# Patient Record
Sex: FEMALE | Race: WHITE | NOT HISPANIC OR LATINO | Employment: FULL TIME | URBAN - METROPOLITAN AREA
[De-identification: names, ages, dates, MRNs, and addresses within clinical notes are randomized per-mention and may not be internally consistent; named-entity substitution may affect disease eponyms.]

---

## 2020-09-16 ENCOUNTER — HOSPITAL ENCOUNTER (OUTPATIENT)
Facility: HOSPITAL | Age: 55
Discharge: HOME | End: 2020-09-17
Attending: EMERGENCY MEDICINE | Admitting: INTERNAL MEDICINE
Payer: COMMERCIAL

## 2020-09-16 ENCOUNTER — CLINICAL SUPPORT (OUTPATIENT)
Dept: CARDIOLOGY | Facility: HOSPITAL | Age: 55
End: 2020-09-16
Attending: INTERNAL MEDICINE
Payer: COMMERCIAL

## 2020-09-16 VITALS — BODY MASS INDEX: 24.25 KG/M2 | WEIGHT: 136.88 LBS | HEIGHT: 63 IN

## 2020-09-16 DIAGNOSIS — R07.9 CHEST PAIN, UNSPECIFIED TYPE: ICD-10-CM

## 2020-09-16 DIAGNOSIS — R07.9 CHEST PAIN: Primary | ICD-10-CM

## 2020-09-16 DIAGNOSIS — E78.5 HYPERLIPIDEMIA, UNSPECIFIED HYPERLIPIDEMIA TYPE: ICD-10-CM

## 2020-09-16 DIAGNOSIS — G47.00 INSOMNIA, UNSPECIFIED TYPE: ICD-10-CM

## 2020-09-16 DIAGNOSIS — J30.2 SEASONAL ALLERGIC RHINITIS, UNSPECIFIED TRIGGER: ICD-10-CM

## 2020-09-16 LAB
ALBUMIN SERPL BCP-MCNC: 4.5 G/DL (ref 3.5–5.2)
ALP SERPL-CCNC: 81 U/L (ref 55–135)
ALT SERPL W/O P-5'-P-CCNC: 25 U/L (ref 10–44)
ANION GAP SERPL CALC-SCNC: 11 MMOL/L (ref 8–16)
AORTIC ROOT ANNULUS: 2.2 CM
AORTIC VALVE CUSP SEPERATION: 1.44 CM
AST SERPL-CCNC: 31 U/L (ref 10–40)
AV INDEX (PROSTH): 0.72
AV MEAN GRADIENT: 6 MMHG
AV PEAK GRADIENT: 10 MMHG
AV VALVE AREA: 2.23 CM2
AV VELOCITY RATIO: 79.18
BASOPHILS # BLD AUTO: 0.02 K/UL (ref 0–0.2)
BASOPHILS NFR BLD: 0.3 % (ref 0–1.9)
BILIRUB SERPL-MCNC: 1.1 MG/DL (ref 0.1–1)
BNP SERPL-MCNC: 36 PG/ML (ref 0–99)
BSA FOR ECHO PROCEDURE: 1.66 M2
BUN SERPL-MCNC: 12 MG/DL (ref 6–20)
CALCIUM SERPL-MCNC: 9.8 MG/DL (ref 8.7–10.5)
CHLORIDE SERPL-SCNC: 102 MMOL/L (ref 95–110)
CK MB SERPL-MCNC: 0.7 NG/ML (ref 0.1–6.5)
CO2 SERPL-SCNC: 27 MMOL/L (ref 23–29)
CREAT SERPL-MCNC: 0.7 MG/DL (ref 0.5–1.4)
CV ECHO LV RWT: 0.4 CM
DIFFERENTIAL METHOD: ABNORMAL
DOP CALC AO PEAK VEL: 1.59 M/S
DOP CALC AO VTI: 34.59 CM
DOP CALC LVOT AREA: 3.1 CM2
DOP CALC LVOT DIAMETER: 1.98 CM
DOP CALC LVOT PEAK VEL: 125.89 M/S
DOP CALC LVOT STROKE VOLUME: 77.15 CM3
DOP CALCLVOT PEAK VEL VTI: 25.07 CM
E WAVE DECELERATION TIME: 153.32 MSEC
E/A RATIO: 1.12
E/E' RATIO: 10.82 M/S
ECHO LV POSTERIOR WALL: 0.82 CM (ref 0.6–1.1)
EOSINOPHIL # BLD AUTO: 0.1 K/UL (ref 0–0.5)
EOSINOPHIL NFR BLD: 1.3 % (ref 0–8)
ERYTHROCYTE [DISTWIDTH] IN BLOOD BY AUTOMATED COUNT: 11.2 % (ref 11.5–14.5)
EST. GFR  (AFRICAN AMERICAN): >60 ML/MIN/1.73 M^2
EST. GFR  (NON AFRICAN AMERICAN): >60 ML/MIN/1.73 M^2
FRACTIONAL SHORTENING: 28 % (ref 28–44)
GLUCOSE SERPL-MCNC: 95 MG/DL (ref 70–110)
HCT VFR BLD AUTO: 41.4 % (ref 37–48.5)
HGB BLD-MCNC: 13.8 G/DL (ref 12–16)
IMM GRANULOCYTES # BLD AUTO: 0.01 K/UL (ref 0–0.04)
IMM GRANULOCYTES NFR BLD AUTO: 0.2 % (ref 0–0.5)
INTERVENTRICULAR SEPTUM: 0.82 CM (ref 0.6–1.1)
IVRT: 80.12 MSEC
LEFT ATRIUM SIZE: 2.89 CM
LEFT INTERNAL DIMENSION IN SYSTOLE: 2.95 CM (ref 2.1–4)
LEFT VENTRICLE MASS INDEX: 61 G/M2
LEFT VENTRICULAR INTERNAL DIMENSION IN DIASTOLE: 4.09 CM (ref 3.5–6)
LEFT VENTRICULAR MASS: 100.2 G
LV LATERAL E/E' RATIO: 9.2 M/S
LV SEPTAL E/E' RATIO: 13.14 M/S
LYMPHOCYTES # BLD AUTO: 1.5 K/UL (ref 1–4.8)
LYMPHOCYTES NFR BLD: 23.5 % (ref 18–48)
MAGNESIUM SERPL-MCNC: 1.9 MG/DL (ref 1.6–2.6)
MAGNESIUM SERPL-MCNC: 2.1 MG/DL (ref 1.6–2.6)
MCH RBC QN AUTO: 31.9 PG (ref 27–31)
MCHC RBC AUTO-ENTMCNC: 33.3 G/DL (ref 32–36)
MCV RBC AUTO: 96 FL (ref 82–98)
MONOCYTES # BLD AUTO: 0.4 K/UL (ref 0.3–1)
MONOCYTES NFR BLD: 6.1 % (ref 4–15)
MV PEAK A VEL: 0.82 M/S
MV PEAK E VEL: 0.92 M/S
NEUTROPHILS # BLD AUTO: 4.4 K/UL (ref 1.8–7.7)
NEUTROPHILS NFR BLD: 68.6 % (ref 38–73)
NRBC BLD-RTO: 0 /100 WBC
PISA TR MAX VEL: 2.03 M/S
PLATELET # BLD AUTO: 106 K/UL (ref 150–350)
PMV BLD AUTO: 12.3 FL (ref 9.2–12.9)
POTASSIUM SERPL-SCNC: 4.3 MMOL/L (ref 3.5–5.1)
PROT SERPL-MCNC: 7.1 G/DL (ref 6–8.4)
PV PEAK VELOCITY: 86.04 CM/S
RA PRESSURE: 3 MMHG
RBC # BLD AUTO: 4.32 M/UL (ref 4–5.4)
RIGHT VENTRICULAR END-DIASTOLIC DIMENSION: 173 CM
SARS-COV-2 RDRP RESP QL NAA+PROBE: NEGATIVE
SODIUM SERPL-SCNC: 140 MMOL/L (ref 136–145)
TDI LATERAL: 0.1 M/S
TDI SEPTAL: 0.07 M/S
TDI: 0.09 M/S
TR MAX PG: 16 MMHG
TROPONIN I SERPL DL<=0.01 NG/ML-MCNC: <0.03 NG/ML
TV REST PULMONARY ARTERY PRESSURE: 19 MMHG
WBC # BLD AUTO: 6.37 K/UL (ref 3.9–12.7)

## 2020-09-16 PROCEDURE — 99900035 HC TECH TIME PER 15 MIN (STAT)

## 2020-09-16 PROCEDURE — 93306 TTE W/DOPPLER COMPLETE: CPT

## 2020-09-16 PROCEDURE — 36415 COLL VENOUS BLD VENIPUNCTURE: CPT

## 2020-09-16 PROCEDURE — 80053 COMPREHEN METABOLIC PANEL: CPT

## 2020-09-16 PROCEDURE — 93306 ECHO (CUPID ONLY): ICD-10-PCS | Mod: 26,,, | Performed by: INTERNAL MEDICINE

## 2020-09-16 PROCEDURE — 93010 EKG 12-LEAD: ICD-10-PCS | Mod: ,,, | Performed by: INTERNAL MEDICINE

## 2020-09-16 PROCEDURE — 94760 N-INVAS EAR/PLS OXIMETRY 1: CPT

## 2020-09-16 PROCEDURE — U0002 COVID-19 LAB TEST NON-CDC: HCPCS

## 2020-09-16 PROCEDURE — 63600175 PHARM REV CODE 636 W HCPCS: Performed by: INTERNAL MEDICINE

## 2020-09-16 PROCEDURE — 82553 CREATINE MB FRACTION: CPT

## 2020-09-16 PROCEDURE — 99900031 HC PATIENT EDUCATION (STAT)

## 2020-09-16 PROCEDURE — 93306 TTE W/DOPPLER COMPLETE: CPT | Mod: 26,,, | Performed by: INTERNAL MEDICINE

## 2020-09-16 PROCEDURE — G0378 HOSPITAL OBSERVATION PER HR: HCPCS

## 2020-09-16 PROCEDURE — 93005 ELECTROCARDIOGRAM TRACING: CPT | Performed by: INTERNAL MEDICINE

## 2020-09-16 PROCEDURE — 85025 COMPLETE CBC W/AUTO DIFF WBC: CPT

## 2020-09-16 PROCEDURE — 93010 ELECTROCARDIOGRAM REPORT: CPT | Mod: ,,, | Performed by: INTERNAL MEDICINE

## 2020-09-16 PROCEDURE — 83880 ASSAY OF NATRIURETIC PEPTIDE: CPT

## 2020-09-16 PROCEDURE — 99220 PR INITIAL OBSERVATION CARE,LEVL III: CPT | Mod: ,,, | Performed by: INTERNAL MEDICINE

## 2020-09-16 PROCEDURE — 99220 PR INITIAL OBSERVATION CARE,LEVL III: ICD-10-PCS | Mod: ,,, | Performed by: INTERNAL MEDICINE

## 2020-09-16 PROCEDURE — 84484 ASSAY OF TROPONIN QUANT: CPT | Mod: 91

## 2020-09-16 PROCEDURE — 83735 ASSAY OF MAGNESIUM: CPT | Mod: 91

## 2020-09-16 PROCEDURE — 83735 ASSAY OF MAGNESIUM: CPT

## 2020-09-16 PROCEDURE — 96372 THER/PROPH/DIAG INJ SC/IM: CPT | Mod: 59

## 2020-09-16 PROCEDURE — 25000003 PHARM REV CODE 250: Performed by: EMERGENCY MEDICINE

## 2020-09-16 PROCEDURE — 84484 ASSAY OF TROPONIN QUANT: CPT

## 2020-09-16 PROCEDURE — 25000003 PHARM REV CODE 250: Performed by: INTERNAL MEDICINE

## 2020-09-16 PROCEDURE — 99285 EMERGENCY DEPT VISIT HI MDM: CPT | Mod: 25

## 2020-09-16 RX ORDER — POLYETHYLENE GLYCOL 3350 17 G/17G
17 POWDER, FOR SOLUTION ORAL DAILY PRN
Status: DISCONTINUED | OUTPATIENT
Start: 2020-09-16 | End: 2020-09-17 | Stop reason: HOSPADM

## 2020-09-16 RX ORDER — NITROGLYCERIN 0.4 MG/1
0.4 TABLET SUBLINGUAL EVERY 5 MIN PRN
Status: DISCONTINUED | OUTPATIENT
Start: 2020-09-16 | End: 2020-09-17 | Stop reason: HOSPADM

## 2020-09-16 RX ORDER — TALC
9 POWDER (GRAM) TOPICAL NIGHTLY PRN
Status: DISCONTINUED | OUTPATIENT
Start: 2020-09-16 | End: 2020-09-17 | Stop reason: HOSPADM

## 2020-09-16 RX ORDER — TALC
8 POWDER (GRAM) TOPICAL NIGHTLY PRN
Status: DISCONTINUED | OUTPATIENT
Start: 2020-09-16 | End: 2020-09-16

## 2020-09-16 RX ORDER — ENOXAPARIN SODIUM 100 MG/ML
40 INJECTION SUBCUTANEOUS EVERY 24 HOURS
Status: DISCONTINUED | OUTPATIENT
Start: 2020-09-16 | End: 2020-09-17 | Stop reason: HOSPADM

## 2020-09-16 RX ORDER — ROSUVASTATIN CALCIUM 20 MG/1
20 TABLET, COATED ORAL DAILY
Status: DISCONTINUED | OUTPATIENT
Start: 2020-09-16 | End: 2020-09-17 | Stop reason: HOSPADM

## 2020-09-16 RX ORDER — DIPHENHYDRAMINE HCL 25 MG
50 CAPSULE ORAL NIGHTLY PRN
Status: DISCONTINUED | OUTPATIENT
Start: 2020-09-16 | End: 2020-09-17 | Stop reason: HOSPADM

## 2020-09-16 RX ORDER — CETIRIZINE HYDROCHLORIDE 10 MG/1
10 TABLET ORAL DAILY
Status: DISCONTINUED | OUTPATIENT
Start: 2020-09-16 | End: 2020-09-17 | Stop reason: HOSPADM

## 2020-09-16 RX ORDER — ASPIRIN 325 MG
325 TABLET ORAL
Status: COMPLETED | OUTPATIENT
Start: 2020-09-16 | End: 2020-09-16

## 2020-09-16 RX ORDER — ASPIRIN 81 MG/1
81 TABLET ORAL DAILY
Status: DISCONTINUED | OUTPATIENT
Start: 2020-09-17 | End: 2020-09-17 | Stop reason: HOSPADM

## 2020-09-16 RX ORDER — ACETAMINOPHEN 325 MG/1
650 TABLET ORAL EVERY 4 HOURS PRN
Status: DISCONTINUED | OUTPATIENT
Start: 2020-09-16 | End: 2020-09-17 | Stop reason: HOSPADM

## 2020-09-16 RX ORDER — SODIUM CHLORIDE 0.9 % (FLUSH) 0.9 %
10 SYRINGE (ML) INJECTION
Status: DISCONTINUED | OUTPATIENT
Start: 2020-09-16 | End: 2020-09-17 | Stop reason: HOSPADM

## 2020-09-16 RX ORDER — LORAZEPAM 2 MG/ML
1 INJECTION INTRAMUSCULAR
Status: DISCONTINUED | OUTPATIENT
Start: 2020-09-16 | End: 2020-09-16

## 2020-09-16 RX ORDER — ONDANSETRON 2 MG/ML
4 INJECTION INTRAMUSCULAR; INTRAVENOUS EVERY 8 HOURS PRN
Status: DISCONTINUED | OUTPATIENT
Start: 2020-09-16 | End: 2020-09-17 | Stop reason: HOSPADM

## 2020-09-16 RX ADMIN — ROSUVASTATIN CALCIUM 20 MG: 20 TABLET, FILM COATED ORAL at 03:09

## 2020-09-16 RX ADMIN — NITROGLYCERIN 0.5 INCH: 20 OINTMENT TOPICAL at 10:09

## 2020-09-16 RX ADMIN — ASPIRIN 325 MG ORAL TABLET 325 MG: 325 PILL ORAL at 10:09

## 2020-09-16 RX ADMIN — ENOXAPARIN SODIUM 40 MG: 100 INJECTION SUBCUTANEOUS at 04:09

## 2020-09-16 NOTE — ED NOTES
Patient is resting comfortably. No pain at this times states it still comes and goes and does not last but few seconds

## 2020-09-16 NOTE — CONSULTS
[facility]  Cardiology  Consult Note    Patient Name: Lina King  MRN: [id number]  Admission Date: 2020  Hospital Length of Stay: 0 days  Code Status: Full Code   Attending Provider: Tien Valdez MD   Consulting Provider: Kristal Morrison MD  Primary Care Physician: Wilda Huynh DO  Principal Problem:Chest pain    Patient information was obtained from patient, past medical records and ER records.     Consults  Subjective:     REASON FOR CONSULT:  Chest pain     HPI:  55-year-old female with a past medical history significant for borderline diabetes mellitus, family history of premature coronary artery disease, her mom reportedly passed away in her 40s secondary to myocardial infarction, asthma presented to the hospital with complaints of chest pain.  Chest pain is located on the left side that lasts a few seconds and spontaneously resolves.  She denies any shortness of breath.  She denies any palpitations.  She denies any lightheadedness, dizziness or any syncopal episodes.  She denies any lower extremity edema.  She has not had any similar symptoms in the past.  She has not had any heart problems that she knows of.  EKG does not show any dynamic ischemic changes.  Serial troponins thus far are negative.    Past Medical History:   Diagnosis Date    Actinic keratosis     AK (actinic keratosis)     Allergic rhinitis     Allergy     Asthma, intermittent     Basal cell cancer ed&c in [place]    chest, right upper lateral thigh    Basal cell carcinoma excised 13    R lateral thigh    BCC (basal cell carcinoma of skin) excised 9/10/13    right lower abdomen    Hyperlipidemia     Keloid cicatrix     Kidney calculi     in early 30's    Recurrent UTI        Past Surgical History:   Procedure Laterality Date    anal fissure      APPENDECTOMY      basal cell removal      breast implants       SECTION, LOW TRANSVERSE      INCISION OF BLADDER NECK CONTRACTURE          Review of patient's allergies indicates:   Allergen Reactions    Penicillins Other (See Comments)     As a child       No current facility-administered medications on file prior to encounter.      Current Outpatient Medications on File Prior to Encounter   Medication Sig    cetirizine (ZYRTEC) 10 MG tablet Take 1 tablet (10 mg total) by mouth once daily.    [DISCONTINUED] doxycycline (VIBRAMYCIN) 100 MG Cap Take 1 tab po BID x 10 days. Take with food and water. Remain upright x 1 hr after taking.    [DISCONTINUED] predniSONE (DELTASONE) 20 MG tablet 3 pills daily for 3 days,  2 pills daily for 3 days,  1 pill daily for 3 days, 1/2 pill daily for 4 days.       Scheduled Meds:   [START ON 9/17/2020] aspirin  81 mg Oral Daily    cetirizine  10 mg Oral Daily    enoxaparin  40 mg Subcutaneous Q24H    rosuvastatin  20 mg Oral Daily     Continuous Infusions:  PRN Meds:.acetaminophen, diphenhydrAMINE, melatonin, nitroGLYCERIN, ondansetron, polyethylene glycol, sodium chloride 0.9%    Family History     Problem Relation (Age of Onset)    ALS Mother    Alcohol abuse Father    Cancer Father    Melanoma Sister    Thyroid disease Sister          Tobacco Use    Smoking status: Never Smoker    Smokeless tobacco: Never Used   Substance and Sexual Activity    Alcohol use: No     Comment: rare    Drug use: Never    Sexual activity: Yes     Partners: Male       ROS   No significant headaches or sore throat or runny nose.   No recent changes in vision.   No recent changes in hearing.  No dysphagia or odynophagia.  Reports chest pain.   Denies any cough or hemoptysis.   Denies any abdominal pain, nausea, vomiting, diarrhea or constipation.   Denies any dysuria or polyuria.   Denies any fevers or chills.   Denies any recent significant weight changes.   Denies bleeding diathesis    Objective:     Vital Signs (Most Recent):  Temp: 98.1 °F (36.7 °C) (09/16/20 1637)  Pulse: 65 (09/16/20 1637)  Resp: 20 (09/16/20  1637)  BP: (!) 154/89 (09/16/20 1637)  SpO2: 97 % (09/16/20 1637) Vital Signs (24h Range):  Temp:  [98.1 °F (36.7 °C)-99.4 °F (37.4 °C)] 98.1 °F (36.7 °C)  Pulse:  [63-79] 65  Resp:  [16-20] 20  SpO2:  [96 %-100 %] 97 %  BP: (116-163)/(60-89) 154/89     Weight: 63.4 kg (139 lb 12.4 oz)  Body mass index is 24.76 kg/m².    SpO2: 97 %  O2 Device (Oxygen Therapy): room air    No intake or output data in the 24 hours ending 09/16/20 1757    Lines/Drains/Airways     Peripheral Intravenous Line                 Peripheral IV - Single Lumen 09/16/20 1015 20 G Left Hand less than 1 day                Physical Exam  HEENT: Normocephalic, atraumatic, PERRL, Conjunctiva pink, no scleral icterus.   CVS: S1S2+, RRR, no murmurs, rubs or gallops, JVP: Normal.  LUNGS: Clear  ABDOMEN: Soft, NT, BS+  EXTREMITIES: No cyanosis, clubbing or edema  NEURO: AAO X 3.       Significant Labs:   BMP:   Recent Labs   Lab 09/16/20  0955   GLU 95      K 4.3      CO2 27   BUN 12   CREATININE 0.7   CALCIUM 9.8   MG 1.9   , CMP   Recent Labs   Lab 09/16/20  0955      K 4.3      CO2 27   GLU 95   BUN 12   CREATININE 0.7   CALCIUM 9.8   PROT 7.1   ALBUMIN 4.5   BILITOT 1.1*   ALKPHOS 81   AST 31   ALT 25   ANIONGAP 11   ESTGFRAFRICA >60.0   EGFRNONAA >60.0   , CBC   Recent Labs   Lab 09/16/20  0955   WBC 6.37   HGB 13.8   HCT 41.4   *   , INR No results for input(s): INR, PROTIME in the last 48 hours., Lipid Panel No results for input(s): CHOL, HDL, LDLCALC, TRIG, CHOLHDL in the last 48 hours. and Troponin   Recent Labs   Lab 09/16/20  0955 09/16/20  1212   TROPONINI <0.030 <0.030       Significant Imaging: Reviewed  Assessment and Plan:     IMPRESSION:  Chest pain.  Negative serial cardiac biomarkers thus far.  No dynamic ECG changes thus far.  Dyslipidemia.  Thrombocytopenia.    RECOMMENDATIONS:  1.  Obtain a stress echocardiogram.  2.  Repeat lipid panel in the morning.  3.  If the stress echocardiogram is normal it is  okay from the Cardiology standpoint for the patient to be discharged for further outpatient workup.    Thank you for your consult. I will follow-up with patient. Please contact us if you have any additional questions.    Kristal Morrison MD  Cardiology   Former[facility]

## 2020-09-16 NOTE — H&P
"[facility] Medicine History & Physical Examination   Patient Name: Lina King  MRN: [id number]  Patient Class: OP- Observation   Admission Date: 9/16/2020  9:26 AM  Length of Stay: 0  Attending Physician: Tien Valdez MD  Primary Care Provider: Wilda Huynh DO  Face-to-Face encounter date: 09/16/2020  Code Status: Full  Chief Complaint: Chest Pain ("TIGHTNESS" ONSET SAT, MOSTLY AT NITES, GETS TIGHT AND THEN GOES AWAY.)        Patient information was obtained from patient, past medical records and ER records.   Case personally discussed with Dr. Adkins , gely with telemetry observation to rule out ACS    HISTORY OF PRESENT ILLNESS:   Lina King is a 55 y.o. white female who  has a past medical history of Actinic keratosis, AK (actinic keratosis), Allergic rhinitis, Allergy, Asthma, intermittent, Basal cell cancer (ed&c in [place]), Basal cell carcinoma (excised 9/24/13), BCC (basal cell carcinoma of skin) (excised 9/10/13), Hyperlipidemia, Keloid cicatrix, Kidney calculi, and Recurrent UTI.. The patient presented to [facility]16[facility]2020 with a primary complaint of Chest Pain ("TIGHTNESS" ONSET SAT, MOSTLY AT NITES, GETS TIGHT AND THEN GOES AWAY.)    Patient presented to ER this morning with complaint of intermittent, episodic left-sided chest pain.  Patient reports she noted 1st 3-4 days ago and it would come and go.  Over time it has becoming more frequent and intensities getting worse.  It is still episode ache.  This morning when she came to the ER the pain was 8/10 with associated chest tightness.  Chest tightness was resolved with nitroglycerin but patient continued to feel episode ache pain that comes and go.  Patient denies associated palpitations, associated shortness of breath.  Denied recent exposure to sick contact, reports she wears her mask religiously whenever she gets out of the house.  Patient did mention that she was told her cholesterol was " slightly elevated but she was never started on cholesterol medications.  Daughter at bedside reported patient also has history of anxiety and insomnia and takes over-the-counter Benadryl 50 mg at bedtime to help sleep.   Denies history of hypertension, thyroid disease, diabetes.  Denies taking any over-the-counter supplement    Upon arrival to the ER, her blood pressure was 163/82, pulse 79, temp of 99.4°, respiration 18 and O2 sat 100% on room air  Lab shows platelets 106 rest within acceptable range  CMP is partially reported though verified with the lab and potassium is 4.3, BUN 12, T bili 1.1 and rest all within normal range  BNP 36, troponin less than 0.03  Total protein 7.1, albumin 4.5  COVID rapid negative  X-ray chest AP and lateral views shows no acute cardio or pulmonary processes  EKG personally reviewed shows sinus rhythm, no acute ST or T abnormality noted    Discussed with patient and daughter that we will admit her under observation to telemetry, will do serial cardiac enzymes, monitor her closely, probably a stress test after the 2nd enzyme if negative.  Will also consult Cardiology Dr. Morrison as her history is consistent with ACS.  Verbalized understanding    PAST MEDICAL HISTORY:     Past Medical History:   Diagnosis Date    Actinic keratosis     AK (actinic keratosis)     Allergic rhinitis     Allergy     Asthma, intermittent     Basal cell cancer ed&c in [place]    chest, right upper lateral thigh    Basal cell carcinoma excised 13    R lateral thigh    BCC (basal cell carcinoma of skin) excised 9/10/13    right lower abdomen    Hyperlipidemia     Keloid cicatrix     Kidney calculi     in early 30's    Recurrent UTI        PAST SURGICAL HISTORY:     Past Surgical History:   Procedure Laterality Date    anal fissure      APPENDECTOMY      basal cell removal      breast implants       SECTION, LOW TRANSVERSE      INCISION OF BLADDER NECK CONTRACTURE    "      ALLERGIES:   Penicillins    FAMILY HISTORY:     Family History   Problem Relation Age of Onset    ALS Mother     Cancer Father         brain    Alcohol abuse Father     Thyroid disease Sister     Melanoma Sister        SOCIAL HISTORY:     Social History     Tobacco Use    Smoking status: Never Smoker    Smokeless tobacco: Never Used   Substance Use Topics    Alcohol use: No     Comment: rare        Social History     Substance and Sexual Activity   Sexual Activity Yes    Partners: Male        HOME MEDICATIONS:     Prior to Admission medications    Medication Sig Start Date End Date Taking? Authorizing Provider   cetirizine (ZYRTEC) 10 MG tablet Take 1 tablet (10 mg total) by mouth once daily. 6/14/13 6/14/14  Wilda Huynh DO   doxycycline (VIBRAMYCIN) 100 MG Cap Take 1 tab po BID x 10 days. Take with food and water. Remain upright x 1 hr after taking. 10/3/13 9/16/20  Tawanna Reid MD   predniSONE (DELTASONE) 20 MG tablet 3 pills daily for 3 days,  2 pills daily for 3 days,  1 pill daily for 3 days, 1/2 pill daily for 4 days. 7/27/13 9/16/20  Wilda Huynh DO       REVIEW OF SYSTEMS:   10 Point Review of System was performed and was found to be negative except for that mentioned already in the HPI above.     PHYSICAL EXAM:   BP (!) 140/84   Pulse 71   Temp 99.4 °F (37.4 °C) (Oral)   Resp 19   Ht 5' 3" (1.6 m)   Wt 62.1 kg (137 lb)   LMP 06/05/2013   SpO2 96%   BMI 24.27 kg/m²     Vitals Reviewed  General appearance: Well-developed, well-nourished female in no apparent distress.  HENT: Atraumatic head. Moist mucous membranes of oral cavity.  Eyes: Normal extraocular movements.   Neck: Supple.   Lungs: Clear to auscultation bilaterally. No wheezing present.   Heart: Regular rate and rhythm. S1 and S2 present with no murmurs/gallop/rub. No pedal edema. No JVD present.  Good pedal pulses  Abdomen: Soft, non-distended, non-tender. No rebound tenderness/guarding. Bowel sounds are normal. "   Extremities: No cyanosis, clubbing, or edema.  Skin: No Rash.   Neuro: Motor and sensory exams grossly intact. Good tone. Muscle strength 5/5 in all 4 extremities  Psych/mental status: Appropriate mood and affect. Responds appropriately to questions.     EMERGENCY DEPARTMENT LABS AND IMAGING:     Labs Reviewed   CBC W/ AUTO DIFFERENTIAL - Abnormal; Notable for the following components:       Result Value    Mean Corpuscular Hemoglobin 31.9 (*)     RDW 11.2 (*)     Platelets 106 (*)     All other components within normal limits   COMPREHENSIVE METABOLIC PANEL - Abnormal; Notable for the following components:    Total Bilirubin 1.1 (*)     All other components within normal limits   TROPONIN I   B-TYPE NATRIURETIC PEPTIDE   MAGNESIUM   SARS-COV-2 RNA AMPLIFICATION, QUAL   TROPONIN I   CK-MB   TROPONIN I   MAGNESIUM       X-Ray Chest PA And Lateral   Final Result          ASSESSMENT & PLAN:   Lina King is a 55 y.o. female admitted for    Active Hospital Problems    Diagnosis    *Chest pain    Hyperlipidemia    Insomnia     Plan  Admit under observation to telemetry  Cardiac enzymes x2 more Q 4 hour  If 2nd cardiac enzyme is negative, will order stress test  Consult Cardiology Dr. Morrison as patient chest tightness improved with nitroglycerin but patient is still having episode ache chest pains  Nitroglycerin an EKG p.r.n. chest pain  Aspirin 81 mg daily  Crestor 20 mg daily  Zofran p.r.n. nausea  Currently NPO in case she will need stress test  EKG concerning for left atrial enlargement, ordered echocardiogram  FLP in a.m.    DVT Prophylaxis: will be placed on Lovenox for DVT prophylaxis and will be advised to be as mobile as possible and sit in a chair as tolerated.   ________________________________________________________________________________    Discharge Planning and Disposition: No mobility needs. Ambulating well. Good social support system. Patient will be discharged in 1 day    Face-to-Face  encounter date: 09/16/2020  Encounter included review of the medical records, interviewing and examining the patient face-to-face, discussion with family and other health care providers including emergency medicine physician, admission orders, interpreting lab/test results and formulating a plan of care.     Medical Decision Making during this encounter was  [_] Low Complexity  [X] Moderate Complexity  [  ] High Complexity  _________________________________________________________________________________    INPATIENT LIST OF MEDICATIONS     Current Facility-Administered Medications:     acetaminophen tablet 650 mg, 650 mg, Oral, Q4H PRN, Tien Valdez MD    [START ON 9/17/2020] aspirin EC tablet 81 mg, 81 mg, Oral, Daily, Tien Valdez MD    cetirizine tablet 10 mg, 10 mg, Oral, Daily, Tien Valdez MD    enoxaparin injection 40 mg, 40 mg, Subcutaneous, Q24H, Tien Valdez MD    melatonin tablet 9 mg, 9 mg, Oral, Nightly PRN, Tien Valdez MD    nitroGLYCERIN SL tablet 0.4 mg, 0.4 mg, Sublingual, Q5 Min PRN, Tien Valdez MD    ondansetron injection 4 mg, 4 mg, Intravenous, Q8H PRN, Tien Valdez MD    polyethylene glycol packet 17 g, 17 g, Oral, Daily PRN, Tien Valdez MD    rosuvastatin tablet 20 mg, 20 mg, Oral, Daily, Tien Valdez MD    sodium chloride 0.9% flush 10 mL, 10 mL, Intravenous, PRN, Tien Valdez MD    Current Outpatient Medications:     cetirizine (ZYRTEC) 10 MG tablet, Take 1 tablet (10 mg total) by mouth once daily., Disp: 30 tablet, Rfl: 3      Scheduled Meds:   [START ON 9/17/2020] aspirin  81 mg Oral Daily    cetirizine  10 mg Oral Daily    enoxaparin  40 mg Subcutaneous Q24H    rosuvastatin  20 mg Oral Daily     Continuous Infusions:  PRN Meds:.acetaminophen, melatonin, nitroGLYCERIN, ondansetron, polyethylene glycol, sodium chloride 0.9%      Tien Valdez MD  [facility] Hospitalist  09/16/2020

## 2020-09-16 NOTE — ED PROVIDER NOTES
"Encounter Date: 2020       History     Chief Complaint   Patient presents with    Chest Pain     "TIGHTNESS" ONSET SAT, MOSTLY AT NITES, GETS TIGHT AND THEN GOES AWAY.     55-year-old female with 3-4 days of intermittent episodes of left-sided chest tightness.  Patient could not tell me if anything makes it better or worse.  Patient rates the tightness 8/10 when it hits her.  Patient denies any shortness of breath.  Patient states the tightness lasts about few minutes and goes away.  Pain does not radiate anywhere and patient could not tell me if anything aggravates it.  Patient states the pain spontaneously goes away.  Denies fever or chills or nausea vomiting or shortness of breath or abdominal pain or weakness or numbness.  Patient was told that her cholesterol was high in the past        Review of patient's allergies indicates:   Allergen Reactions    Penicillins Other (See Comments)     As a child     Past Medical History:   Diagnosis Date    Actinic keratosis     AK (actinic keratosis)     Allergic rhinitis     Allergy     Asthma, intermittent     Basal cell cancer ed&c in [place]    chest, right upper lateral thigh    Basal cell carcinoma excised 13    R lateral thigh    BCC (basal cell carcinoma of skin) excised 9/10/13    right lower abdomen    Hyperlipidemia     Keloid cicatrix     Kidney calculi     in early 30's    Recurrent UTI      Past Surgical History:   Procedure Laterality Date    anal fissure      APPENDECTOMY      basal cell removal      breast implants       SECTION, LOW TRANSVERSE      INCISION OF BLADDER NECK CONTRACTURE       Family History   Problem Relation Age of Onset    ALS Mother     Cancer Father         brain    Alcohol abuse Father     Thyroid disease Sister     Melanoma Sister      Social History     Tobacco Use    Smoking status: Never Smoker    Smokeless tobacco: Never Used   Substance Use Topics    Alcohol use: No     Comment: rare "    Drug use: No     Review of Systems   Constitutional: Negative.    HENT: Negative.    Eyes: Negative.    Respiratory: Negative.    Cardiovascular: Positive for chest pain.   Gastrointestinal: Negative.    Endocrine: Negative.    Genitourinary: Negative.    Musculoskeletal: Negative.    Skin: Negative.    Allergic/Immunologic: Negative.    Neurological: Negative.    Hematological: Negative.    Psychiatric/Behavioral: Negative.    All other systems reviewed and are negative.      Physical Exam     Initial Vitals [09/16/20 0931]   BP Pulse Resp Temp SpO2   (!) 163/82 79 18 99.4 °F (37.4 °C) 100 %      MAP       --         Physical Exam    Nursing note and vitals reviewed.  Constitutional: She appears well-developed and well-nourished.   HENT:   Head: Normocephalic and atraumatic.   Nose: Nose normal.   Mouth/Throat: Oropharynx is clear and moist.   Eyes: Conjunctivae and EOM are normal. Pupils are equal, round, and reactive to light.   Neck: Normal range of motion. Neck supple. No thyromegaly present. No tracheal deviation present. No JVD present.   Cardiovascular: Normal rate, regular rhythm, normal heart sounds and intact distal pulses.   No murmur heard.  Pulmonary/Chest: Breath sounds normal. No stridor. No respiratory distress. She has no wheezes. She has no rales. She exhibits no tenderness.   Abdominal: Soft. Bowel sounds are normal. She exhibits no distension. There is no abdominal tenderness. There is no rebound and no guarding.   Musculoskeletal: Normal range of motion. No edema.   Neurological: She is alert and oriented to person, place, and time. She has normal strength. No cranial nerve deficit or sensory deficit. GCS score is 15. GCS eye subscore is 4. GCS verbal subscore is 5. GCS motor subscore is 6.   Skin: Skin is warm. Capillary refill takes less than 2 seconds.   Psychiatric: She has a normal mood and affect. Thought content normal.         ED Course   Procedures  Labs Reviewed   CBC W/ AUTO  DIFFERENTIAL - Abnormal; Notable for the following components:       Result Value    Mean Corpuscular Hemoglobin 31.9 (*)     RDW 11.2 (*)     Platelets 106 (*)     All other components within normal limits   CULTURE, BLOOD   CULTURE, BLOOD   COMPREHENSIVE METABOLIC PANEL   TROPONIN I   B-TYPE NATRIURETIC PEPTIDE   MAGNESIUM   TROPONIN I   LACTIC ACID, PLASMA   LACTIC ACID, PLASMA   SARS-COV-2 RNA AMPLIFICATION, QUAL     EKG Readings: (Independently Interpreted)   Rhythm: Normal Sinus Rhythm. Ectopy: No Ectopy. Conduction: Normal. ST Segments: Normal ST Segments. T Waves: Normal. Clinical Impression: Normal Sinus Rhythm     ECG Results          EKG 12-lead (In process)  Result time 09/16/20 09:51:20    In process by Interface, Lab In [facility] (09/16/20 09:51:20)                 Narrative:    Test Reason : R07.9,    Vent. Rate : 071 BPM     Atrial Rate : 071 BPM     P-R Int : 134 ms          QRS Dur : 074 ms      QT Int : 408 ms       P-R-T Axes : 065 020 037 degrees     QTc Int : 443 ms    Normal sinus rhythm  Possible Left atrial enlargement  Low voltage QRS  Borderline Abnormal ECG  No previous ECGs available    Referred By:             Confirmed By:                             Imaging Results          X-Ray Chest PA And Lateral (Final result)  Result time 09/16/20 09:55:06    Final result by Tirso Nettles MD (09/16/20 09:55:06)                 Narrative:    HISTORY: Chest  pain, tightness.    FINDINGS: PA and lateral chest radiograph at 0936 hours with no prior  studies for comparison show the trachea is midline, with the  cardiomediastinal silhouette and pulmonary vascular distribution  within normal limits.    The lungs are normally and symmetrically expanded, with no  consolidation, pleural effusion or evidence of pulmonary edema. No  confluent infiltrates or pneumothorax. There are no significant  osseous abnormalities.    IMPRESSION: No evidence of active cardiopulmonary disease.    Electronically Signed by  Tirso CARRANZA on 9/16/2020 9:58 AM                            X-Rays:   Independently Interpreted Readings:   Other Readings:  Chest x-ray unremarkable    Medical Decision Making:   Differential Diagnosis:   55-year-old female with intermittent episodes of chest tightness.  Symptoms spontaneously resolve however given the intermittent nature of chest tightness nitroglycerin placed.  Patient had screening cardiac workup which is unremarkable.  Given the recurrent nature of this pain and presentation Hospital Medicine consulted for evaluation for admission and further management.  Patient would need stress test for further evaluation of her symptoms.  Clinical Tests:   Lab Tests: Reviewed  Radiological Study: Reviewed  Medical Tests: Reviewed                             Clinical Impression:       ICD-10-CM ICD-9-CM   1. Chest pain  R07.9 786.50   2. Chest pain  R07.9 786.50                                               Dylon Adkins MD  09/16/20 1038       Dylon Adkins MD  09/16/20 1128

## 2020-09-17 ENCOUNTER — CLINICAL SUPPORT (OUTPATIENT)
Dept: CARDIOLOGY | Facility: HOSPITAL | Age: 55
End: 2020-09-17
Attending: INTERNAL MEDICINE
Payer: COMMERCIAL

## 2020-09-17 VITALS
TEMPERATURE: 98 F | BODY MASS INDEX: 24.61 KG/M2 | DIASTOLIC BLOOD PRESSURE: 69 MMHG | HEART RATE: 69 BPM | SYSTOLIC BLOOD PRESSURE: 144 MMHG | HEIGHT: 63 IN | OXYGEN SATURATION: 100 % | RESPIRATION RATE: 19 BRPM | WEIGHT: 138.88 LBS

## 2020-09-17 PROBLEM — R07.9 CHEST PAIN: Status: RESOLVED | Noted: 2020-09-16 | Resolved: 2020-09-17

## 2020-09-17 LAB
ANION GAP SERPL CALC-SCNC: 10 MMOL/L (ref 8–16)
BASOPHILS # BLD AUTO: 0.02 K/UL (ref 0–0.2)
BASOPHILS NFR BLD: 0.3 % (ref 0–1.9)
BUN SERPL-MCNC: 14 MG/DL (ref 6–20)
CALCIUM SERPL-MCNC: 9.3 MG/DL (ref 8.7–10.5)
CHLORIDE SERPL-SCNC: 104 MMOL/L (ref 95–110)
CHOLEST SERPL-MCNC: 245 MG/DL (ref 120–199)
CHOLEST/HDLC SERPL: 3.4 {RATIO} (ref 2–5)
CO2 SERPL-SCNC: 26 MMOL/L (ref 23–29)
CREAT SERPL-MCNC: 0.6 MG/DL (ref 0.5–1.4)
DIFFERENTIAL METHOD: ABNORMAL
EOSINOPHIL # BLD AUTO: 0.1 K/UL (ref 0–0.5)
EOSINOPHIL NFR BLD: 0.8 % (ref 0–8)
ERYTHROCYTE [DISTWIDTH] IN BLOOD BY AUTOMATED COUNT: 11 % (ref 11.5–14.5)
EST. GFR  (AFRICAN AMERICAN): >60 ML/MIN/1.73 M^2
EST. GFR  (NON AFRICAN AMERICAN): >60 ML/MIN/1.73 M^2
GLUCOSE SERPL-MCNC: 84 MG/DL (ref 70–110)
HCT VFR BLD AUTO: 40.7 % (ref 37–48.5)
HDLC SERPL-MCNC: 72 MG/DL (ref 40–75)
HDLC SERPL: 29.4 % (ref 20–50)
HGB BLD-MCNC: 13.9 G/DL (ref 12–16)
IMM GRANULOCYTES # BLD AUTO: 0.02 K/UL (ref 0–0.04)
IMM GRANULOCYTES NFR BLD AUTO: 0.3 % (ref 0–0.5)
LDLC SERPL CALC-MCNC: 152.2 MG/DL (ref 63–159)
LYMPHOCYTES # BLD AUTO: 2.1 K/UL (ref 1–4.8)
LYMPHOCYTES NFR BLD: 26.7 % (ref 18–48)
MAGNESIUM SERPL-MCNC: 2.1 MG/DL (ref 1.6–2.6)
MCH RBC QN AUTO: 32.2 PG (ref 27–31)
MCHC RBC AUTO-ENTMCNC: 34.2 G/DL (ref 32–36)
MCV RBC AUTO: 94 FL (ref 82–98)
MONOCYTES # BLD AUTO: 0.5 K/UL (ref 0.3–1)
MONOCYTES NFR BLD: 6.9 % (ref 4–15)
NEUTROPHILS # BLD AUTO: 5 K/UL (ref 1.8–7.7)
NEUTROPHILS NFR BLD: 65 % (ref 38–73)
NONHDLC SERPL-MCNC: 173 MG/DL
NRBC BLD-RTO: 0 /100 WBC
PLATELET # BLD AUTO: 232 K/UL (ref 150–350)
PMV BLD AUTO: 11.2 FL (ref 9.2–12.9)
POTASSIUM SERPL-SCNC: 3.6 MMOL/L (ref 3.5–5.1)
RBC # BLD AUTO: 4.32 M/UL (ref 4–5.4)
SODIUM SERPL-SCNC: 140 MMOL/L (ref 136–145)
TRIGL SERPL-MCNC: 104 MG/DL (ref 30–150)
WBC # BLD AUTO: 7.67 K/UL (ref 3.9–12.7)

## 2020-09-17 PROCEDURE — G0378 HOSPITAL OBSERVATION PER HR: HCPCS

## 2020-09-17 PROCEDURE — 99225 PR SUBSEQUENT OBSERVATION CARE,LEVEL II: ICD-10-PCS | Mod: ,,, | Performed by: INTERNAL MEDICINE

## 2020-09-17 PROCEDURE — 93351 STRESS ECHO (CUPID ONLY): ICD-10-PCS | Mod: 26,,, | Performed by: INTERNAL MEDICINE

## 2020-09-17 PROCEDURE — 36415 COLL VENOUS BLD VENIPUNCTURE: CPT

## 2020-09-17 PROCEDURE — 85025 COMPLETE CBC W/AUTO DIFF WBC: CPT

## 2020-09-17 PROCEDURE — 63600175 PHARM REV CODE 636 W HCPCS: Performed by: INTERNAL MEDICINE

## 2020-09-17 PROCEDURE — 80061 LIPID PANEL: CPT

## 2020-09-17 PROCEDURE — 25000003 PHARM REV CODE 250: Performed by: INTERNAL MEDICINE

## 2020-09-17 PROCEDURE — 93351 STRESS TTE COMPLETE: CPT | Mod: 26,,, | Performed by: INTERNAL MEDICINE

## 2020-09-17 PROCEDURE — 96374 THER/PROPH/DIAG INJ IV PUSH: CPT | Mod: 59

## 2020-09-17 PROCEDURE — 96376 TX/PRO/DX INJ SAME DRUG ADON: CPT

## 2020-09-17 PROCEDURE — 93351 STRESS TTE COMPLETE: CPT

## 2020-09-17 PROCEDURE — 80048 BASIC METABOLIC PNL TOTAL CA: CPT

## 2020-09-17 PROCEDURE — 83735 ASSAY OF MAGNESIUM: CPT

## 2020-09-17 PROCEDURE — 99225 PR SUBSEQUENT OBSERVATION CARE,LEVEL II: CPT | Mod: ,,, | Performed by: INTERNAL MEDICINE

## 2020-09-17 RX ORDER — ASPIRIN 81 MG/1
81 TABLET ORAL DAILY
Refills: 0 | COMMUNITY
Start: 2020-09-17 | End: 2022-03-28

## 2020-09-17 RX ORDER — PANTOPRAZOLE SODIUM 40 MG/1
40 TABLET, DELAYED RELEASE ORAL DAILY
Qty: 10 TABLET | Refills: 0 | Status: SHIPPED | OUTPATIENT
Start: 2020-09-17 | End: 2020-09-23 | Stop reason: SDUPTHER

## 2020-09-17 RX ORDER — NITROGLYCERIN 0.4 MG/1
0.4 TABLET SUBLINGUAL EVERY 5 MIN PRN
Qty: 90 TABLET | Refills: 0 | Status: SHIPPED | OUTPATIENT
Start: 2020-09-17 | End: 2022-12-12

## 2020-09-17 RX ORDER — ROSUVASTATIN CALCIUM 20 MG/1
20 TABLET, COATED ORAL DAILY
Qty: 30 TABLET | Refills: 3 | Status: SHIPPED | OUTPATIENT
Start: 2020-09-17 | End: 2020-12-23 | Stop reason: SDUPTHER

## 2020-09-17 RX ORDER — ONDANSETRON 4 MG/1
4 TABLET, ORALLY DISINTEGRATING ORAL EVERY 8 HOURS PRN
Qty: 10 TABLET | Refills: 0 | Status: SHIPPED | OUTPATIENT
Start: 2020-09-17 | End: 2020-09-23

## 2020-09-17 RX ADMIN — ALUMINUM HYDROXIDE, MAGNESIUM HYDROXIDE, AND SIMETHICONE 50 ML: 200; 200; 20 SUSPENSION ORAL at 05:09

## 2020-09-17 RX ADMIN — ONDANSETRON 4 MG: 2 INJECTION INTRAMUSCULAR; INTRAVENOUS at 11:09

## 2020-09-17 NOTE — HOSPITAL COURSE
55-year-old white female with known past medical history of allergic rhinitis, intermittent asthma, basal cell carcinoma skin, acid reflux, kidney stone, history of recurrent UTI admitted 09/16/2020 with diagnosis of chest pain which was very concerning for ACS.  Patient was admitted to cardiac unit, serial cardiac enzymes were done which were negative.  Lipid profile shows elevated cholesterol with total cholesterol being 245,  .  Patient was started on Crestor 20 mg HS.  Cardiology Dr. Morrison was consulted.  Recommended stress echo for the next morning.  Patient was made NPO and during the stress test, patient was unable to perform exercise and became weak and felt like she is going to faint.  Status to was aborted.  Patient return to room.  Patient was given nausea medication.  Patient had 1 episode of vomiting (bilious), patient reported happens after fasting.  Patient was given 1 dose of GI cocktail.  Advised patient to eat and if she tolerates I can discharge her.  Patient requesting to be discharged.  Informed patient that she needs to be tolerating food before I can discharge.  Patient ate her dinner and tolerated it well.  Around 6:30 p.m. patient requested that she wants to go home and she has tolerated her food.  Communicated with Dr. Morrison who recommended patient can be discharged on Protonix, nitroglycerin and he will arrange Lexiscan as outpatient for her.    I communicated with the patient regarding his recommendation.  Patient verbalized understanding.  Reports she will call Monday to schedule an appointment and stress test.    Patient was seen and examined on the day of discharge  Vitals reviewed.  Constitutional: No distress.   HENT: NC  Head: Atraumatic.   Cardiovascular: Normal rate, regular rhythm and normal heart sounds.   Pulmonary/Chest: Effort normal. No wheezes.   Abdominal: Soft. Bowel sounds are normal. No distension and no mass. No tenderness  Neurological: Alert.   Skin:  Skin is warm and dry.   Psych:  Appropriate mood and affect

## 2020-09-17 NOTE — PLAN OF CARE
PCP-none, gave list of providers in area.   Insurance:[facility]  Pharmacy- [facility]  Patient denies any needs at this time.    09/17/20 1215   Discharge Assessment   Assessment Type Discharge Planning Assessment   Confirmed/corrected address and phone number on facesheet? Yes   Assessment information obtained from? Patient   Expected Length of Stay (days) 2   Communicated expected length of stay with patient/caregiver yes   Prior to hospitilization cognitive status: Alert/Oriented   Prior to hospitalization functional status: Independent   Current cognitive status: Alert/Oriented   Current Functional Status: Independent   Facility Arrived From: home   Lives With significant other   Able to Return to Prior Arrangements yes   Is patient able to care for self after discharge? Yes   Who are your caregiver(s) and their phone number(s)? Juan, significant other, [phone]   Patient's perception of discharge disposition home or selfcare   Readmission Within the Last 30 Days no previous admission in last 30 days   Patient currently being followed by outpatient case management? No   Patient currently receives any other outside agency services? No   Equipment Currently Used at Home none   Do you have any problems affording any of your prescribed medications? No   Is the patient taking medications as prescribed? yes   Does the patient have transportation home? Yes   Transportation Anticipated family or friend will provide   Dialysis Name and Scheduled days na   Does the patient receive services at the Coumadin Clinic? No   Discharge Plan A Home   Discharge Plan B Home   DME Needed Upon Discharge  none

## 2020-09-17 NOTE — DISCHARGE INSTRUCTIONS
New prescription sent to your pharmacy    Nitroglycerin 0.4 mg 1 tablet every 5 minutes x3 for chest pain as needed  Crestor 20 mg 1 tablet at bedtime for your high cholesterol  Aspirin 81 mg daily  Pantoprazole 40 mg every morning for 10 days for acid reflux    Make an appointment with Dr. Morrison in 1 week to schedule outpatient stress test  Follow-up with your primary care physician in 2 weeks

## 2020-09-17 NOTE — PLAN OF CARE
09/16/20 2046   PRE-TX-O2   O2 Device (Oxygen Therapy) room air   SpO2 95 %   Pulse Oximetry Type Intermittent   $ Pulse Oximetry - Single Charge Pulse Oximetry - Single   Pulse 64   Resp 20   Education   $ Education 15 min   Respiratory Evaluation   $ Care Plan Tech Time 15 min

## 2020-09-17 NOTE — DISCHARGE SUMMARY
"[facility]  Discharge Summary      Patient Name: Lina King  MRN: [id number]  Admission Date: 9/16/2020  Hospital Length of Stay: 0 days  Discharge Date and Time:  09/17/2020 6:59 PM  Attending Physician: Tien Valdez MD   Discharging Provider: Tien Valdez MD  Primary Care Provider: Wilda Huynh DO      HISTORY OF PRESENT ILLNESS:   Lina King is a 55 y.o. white female who  has a past medical history of Actinic keratosis, AK (actinic keratosis), Allergic rhinitis, Allergy, Asthma, intermittent, Basal cell cancer (ed&c in [place]), Basal cell carcinoma (excised 9/24/13), BCC (basal cell carcinoma of skin) (excised 9/10/13), Hyperlipidemia, Keloid cicatrix, Kidney calculi, and Recurrent UTI.. The patient presented to [facility] on 9/16/2020 with a primary complaint of Chest Pain ("TIGHTNESS" ONSET SAT, MOSTLY AT NITES, GETS TIGHT AND THEN GOES AWAY.)     Patient presented to ER this morning with complaint of intermittent, episodic left-sided chest pain.  Patient reports she noted 1st 3-4 days ago and it would come and go.  Over time it has becoming more frequent and intensities getting worse.  It is still episode ache.  This morning when she came to the ER the pain was 8/10 with associated chest tightness.  Chest tightness was resolved with nitroglycerin but patient continued to feel episode ache pain that comes and go.  Patient denies associated palpitations, associated shortness of breath.  Denied recent exposure to sick contact, reports she wears her mask religiously whenever she gets out of the house.  Patient did mention that she was told her cholesterol was slightly elevated but she was never started on cholesterol medications.  Daughter at bedside reported patient also has history of anxiety and insomnia and takes over-the-counter Benadryl 50 mg at bedtime to help sleep.   Denies history of hypertension, thyroid disease, diabetes.  Denies taking " any over-the-counter supplement     Upon arrival to the ER, her blood pressure was 163/82, pulse 79, temp of 99.4°, respiration 18 and O2 sat 100% on room air  Lab shows platelets 106 rest within acceptable range  CMP is partially reported though verified with the lab and potassium is 4.3, BUN 12, T bili 1.1 and rest all within normal range  BNP 36, troponin less than 0.03  Total protein 7.1, albumin 4.5  COVID rapid negative  X-ray chest AP and lateral views shows no acute cardio or pulmonary processes  EKG personally reviewed shows sinus rhythm, no acute ST or T abnormality noted     Discussed with patient and daughter that we will admit her under observation to telemetry, will do serial cardiac enzymes, monitor her closely, probably a stress test after the 2nd enzyme if negative.  Will also consult Cardiology Dr. Morrison as her history is consistent with ACS.  Verbalized understanding    * No surgery found *      Hospital Course:   55-year-old white female with known past medical history of allergic rhinitis, intermittent asthma, basal cell carcinoma skin, acid reflux, kidney stone, history of recurrent UTI admitted 09/16/2020 with diagnosis of chest pain which was very concerning for ACS.  Patient was admitted to cardiac unit, serial cardiac enzymes were done which were negative.  Lipid profile shows elevated cholesterol with total cholesterol being 245,  .  Patient was started on Crestor 20 mg HS.  Cardiology Dr. Morrison was consulted.  Recommended stress echo for the next morning.  Patient was made NPO and during the stress test, patient was unable to perform exercise and became weak and felt like she is going to faint.  Status to was aborted.  Patient return to room.  Patient was given nausea medication.  Patient had 1 episode of vomiting (bilious), patient reported happens after fasting.  Patient was given 1 dose of GI cocktail.  Advised patient to eat and if she tolerates I can discharge her.   Patient requesting to be discharged.  Informed patient that she needs to be tolerating food before I can discharge.  Patient ate her dinner and tolerated it well.  Around 6:30 p.m. patient requested that she wants to go home and she has tolerated her food.  Communicated with Dr. Morrison who recommended patient can be discharged on Protonix, nitroglycerin and he will arrange Lexiscan as outpatient for her.    I communicated with the patient regarding his recommendation.  Patient verbalized understanding.  Reports she will call Monday to schedule an appointment and stress test.    Patient was seen and examined on the day of discharge  Vitals reviewed.  Constitutional: No distress.   HENT: NC  Head: Atraumatic.   Cardiovascular: Normal rate, regular rhythm and normal heart sounds.   Pulmonary/Chest: Effort normal. No wheezes.   Abdominal: Soft. Bowel sounds are normal. No distension and no mass. No tenderness  Neurological: Alert.   Skin: Skin is warm and dry.   Psych:  Appropriate mood and affect         Consults:   Consults (From admission, onward)        Status Ordering Provider     Inpatient consult to Cardiology  Once     Provider:  Kristal Morrison MD    Acknowledged TWILA BADILLO     Inpatient consult to Hospitalist  Once     Provider:  Twila Badillo MD    Acknowledged TWILA BADILLO          No new Assessment & Plan notes have been filed under this hospital service since the last note was generated.  Service: Hospital Medicine    Final Active Diagnoses:    Diagnosis Date Noted POA    Hyperlipidemia [E78.5] 09/16/2020 Yes    Insomnia [G47.00] 06/14/2013 Yes      Problems Resolved During this Admission:    Diagnosis Date Noted Date Resolved POA    PRINCIPAL PROBLEM:  Chest pain [R07.9] 09/16/2020 09/17/2020 Yes       Discharged Condition: good    Disposition: Home or Self Care    Follow Up:  Follow-up Information     Wilda Huynh DO. Schedule an appointment as soon as possible for a visit in 2 weeks.     Specialty: Family Medicine  Why: Post hospital discharge follow-up  Contact information:  [address] [facility] [id number] [phone]             Kristal Morrison MD. Schedule an appointment as soon as possible for a visit in 1 week.    Specialties: Cardiovascular Disease, Cardiology, INTERVENTIONAL CARDIOLOGY  Why: Post hospital discharge follow-up for chest pain and to schedule outpatient stress test  Contact information:  [address] [facility]  [address]  [facility] [address]  [phone]                 Patient Instructions:      Diet Cardiac     Notify your health care provider if you experience any of the following:  severe uncontrolled pain     Notify your health care provider if you experience any of the following:  difficulty breathing or increased cough     Activity as tolerated       Significant Diagnostic Studies: Labs:   BMP:   Recent Labs   Lab 09/16/20  0955 09/16/20  1823 09/17/20  0425   GLU 95  --  84     --  140   K 4.3  --  3.6     --  104   CO2 27  --  26   BUN 12  --  14   CREATININE 0.7  --  0.6   CALCIUM 9.8  --  9.3   MG 1.9 2.1 2.1   , CBC   Recent Labs   Lab 09/16/20  0955 09/17/20  0425   WBC 6.37 7.67   HGB 13.8 13.9   HCT 41.4 40.7   * 232   , INR No results found for: INR, PROTIME, Lipid Panel   Lab Results   Component Value Date    CHOL 245 (H) 09/17/2020    HDL 72 09/17/2020    LDLCALC 152.2 09/17/2020    TRIG 104 09/17/2020    CHOLHDL 29.4 09/17/2020   , Troponin   Recent Labs   Lab 09/16/20  0955 09/16/20  1212 09/16/20  1823   TROPONINI <0.030 <0.030 <0.030    and A1C: No results for input(s): HGBA1C in the last 4320 hours.    Pending Diagnostic Studies:     Procedure Component Value Units Date/Time    Stress Echo Which stress agent will be used? Treadmill Exercise; Color Flow Doppler? No [[id number]] Resulted: 09/17/20 0806    Order Status: Sent Lab Status: In process Updated: 09/17/20 0811     BSA 1.68 m2      Systolic blood pressure 109 mmHg       Diastolic blood pressure 78 mmHg      HR at rest 66 bpm      RPP 7,194     Peak  bpm      Peak Systolic  mmHg      Peak Diatolic BP 87 mmHg      Peak RPP 14,832     Estimated METs 7     Max Predicted      85% Max Predicted      % Max HR Achieved 66     OHS CV CPX PATIENT IS MALE 0     OHS CV CPX PATIENT IS FEMALE 1     Exercise duration (sec) 14 seconds      Exercise duration (min) 4 minutes      Angina Index 0    Narrative:      · The test was stopped because the patient experienced lightheadedness.   The patient requested the test to be stopped.       Impression:               Medications:  Reconciled Home Medications:      Medication List      START taking these medications    aspirin 81 MG EC tablet  Commonly known as: ECOTRIN  Take 1 tablet (81 mg total) by mouth once daily.     nitroGLYCERIN 0.4 MG SL tablet  Commonly known as: NITROSTAT  Place 1 tablet (0.4 mg total) under the tongue every 5 (five) minutes as needed for Chest pain.     ondansetron 4 MG Tbdl  Commonly known as: ZOFRAN-ODT  Take 1 tablet (4 mg total) by mouth every 8 (eight) hours as needed.     pantoprazole 40 MG tablet  Commonly known as: PROTONIX  Take 1 tablet (40 mg total) by mouth once daily. for 10 days     rosuvastatin 20 MG tablet  Commonly known as: CRESTOR  Take 1 tablet (20 mg total) by mouth once daily.        CONTINUE taking these medications    cetirizine 10 MG tablet  Commonly known as: ZYRTEC  Take 1 tablet (10 mg total) by mouth once daily.            Indwelling Lines/Drains at time of discharge:   Lines/Drains/Airways     None                 Time spent on the discharge of patient: 30 minutes  Patient was seen and examined on the date of discharge and determined to be suitable for discharge.         Tien Valdez MD  Department of Hospital Medicine  [facility]

## 2020-09-17 NOTE — PROGRESS NOTES
[facility]  Cardiology  Progress Note    Patient Name: Lina King  MRN: [id number]  Admission Date: 9/16/2020  Hospital Length of Stay: 0 days  Code Status: Full Code   Attending Physician: Tien Valdez MD   Primary Care Physician: Wilda Huynh DO  Expected Discharge Date:   Principal Problem:Chest pain    Subjective:       Interval History: Denies any chest pain overnight. She reports nausea. She exercised on the treadmill for a few minutes and felt extremely lightheaded and dizzy and felt like throwing up. Stress test was hence stopped. I discussed further options including proceeding with a Lexiscan today or tomorrow. AT this point in time she wants to wait till she feels better and consider stress test as an outpatient.     ROS   Reports nausea. Denies any abdominal pain.   Denies any cough or shortness of breath.   Denies any seizures.   Objective:     Vital Signs (Most Recent):  Temp: 98.2 °F (36.8 °C) (09/17/20 0720)  Pulse: 65 (09/17/20 0720)  Resp: 19 (09/17/20 0720)  BP: 125/79 (09/17/20 0720)  SpO2: 98 % (09/17/20 0720) Vital Signs (24h Range):  Temp:  [97.1 °F (36.2 °C)-99.4 °F (37.4 °C)] 98.2 °F (36.8 °C)  Pulse:  [62-79] 65  Resp:  [16-20] 19  SpO2:  [95 %-100 %] 98 %  BP: (116-163)/(60-89) 125/79     Weight: 63 kg (138 lb 14.2 oz)(lying)  Body mass index is 24.6 kg/m².    SpO2: 98 %  O2 Device (Oxygen Therapy): room air    No intake or output data in the 24 hours ending 09/17/20 0858    Lines/Drains/Airways     Peripheral Intravenous Line                 Peripheral IV - Single Lumen 09/16/20 1015 20 G Left Hand less than 1 day                Scheduled Meds:   aspirin  81 mg Oral Daily    cetirizine  10 mg Oral Daily    enoxaparin  40 mg Subcutaneous Q24H    rosuvastatin  20 mg Oral Daily     Continuous Infusions:  PRN Meds:.acetaminophen, diphenhydrAMINE, melatonin, nitroGLYCERIN, ondansetron, polyethylene glycol, sodium chloride 0.9%     Physical Exam  HEENT: Normocephalic,  atraumatic, PERRL, Conjunctiva pink, no scleral icterus.   CVS: S1S2+, RRR, no murmurs, rubs or gallops, JVP: Normal.  LUNGS: Clear  ABDOMEN: Soft, NT, BS+  EXTREMITIES: No cyanosis, clubbing or edema  NEURO: AAO X 3.       Significant Labs:   BMP:   Recent Labs   Lab 09/16/20  0955 09/16/20  1823 09/17/20  0425   GLU 95  --  84     --  140   K 4.3  --  3.6     --  104   CO2 27  --  26   BUN 12  --  14   CREATININE 0.7  --  0.6   CALCIUM 9.8  --  9.3   MG 1.9 2.1 2.1   , CMP   Recent Labs   Lab 09/16/20  0955 09/17/20  0425    140   K 4.3 3.6    104   CO2 27 26   GLU 95 84   BUN 12 14   CREATININE 0.7 0.6   CALCIUM 9.8 9.3   PROT 7.1  --    ALBUMIN 4.5  --    BILITOT 1.1*  --    ALKPHOS 81  --    AST 31  --    ALT 25  --    ANIONGAP 11 10   ESTGFRAFRICA >60.0 >60.0   EGFRNONAA >60.0 >60.0   , CBC   Recent Labs   Lab 09/16/20  0955 09/17/20  0425   WBC 6.37 7.67   HGB 13.8 13.9   HCT 41.4 40.7   * 232   , INR No results for input(s): INR, PROTIME in the last 48 hours., Lipid Panel   Recent Labs   Lab 09/17/20  0425   CHOL 245*   HDL 72   LDLCALC 152.2   TRIG 104   CHOLHDL 29.4    and Troponin   Recent Labs   Lab 09/16/20  0955 09/16/20  1212 09/16/20  1823   TROPONINI <0.030 <0.030 <0.030       Significant Imaging: Reviewed  Assessment and Plan:     IMPRESSION:  Chest pain.  Negative serial cardiac biomarkers thus far.  No dynamic ECG changes thus far. Unable to complete stress test as patient had nausea/vomiting and felt extremely dizzy and lightheaded. Her BP and heart rate were normal. She did not have any chest pain or ischemic ECG changes when she reported symptoms. She could not achieve target heart rate.   Dyslipidemia.  Thrombocytopenia. Likely spurious. Normalized.       PLAN:  1. Continue crestor at home. Low fat diet was discussed.   2. I discussed further options, including proceeding with a Lexiscan today or tomorrow. She wants to wait until she feels better and wants to  have a stress test done as an outpatient. Will do treadmill myoview stress test as an outpatient.   3. Follow up in clinic in 1-2 weeks or sooner if needed.   4. Discussed with Dr Valdez.       Kristal Morrison MD  Cardiology  [facility]

## 2020-09-18 LAB
BSA FOR ECHO PROCEDURE: 1.68 M2
CV STRESS BASE HR: 66 BPM
DIASTOLIC BLOOD PRESSURE: 78 MMHG
OHS CV CPX 85 PERCENT MAX PREDICTED HEART RATE MALE: 132
OHS CV CPX ESTIMATED METS: 7
OHS CV CPX MAX PREDICTED HEART RATE: 156
OHS CV CPX PATIENT IS FEMALE: 1
OHS CV CPX PATIENT IS MALE: 0
OHS CV CPX PEAK DIASTOLIC BLOOD PRESSURE: 87 MMHG
OHS CV CPX PEAK HEAR RATE: 103 BPM
OHS CV CPX PEAK RATE PRESSURE PRODUCT: NORMAL
OHS CV CPX PEAK SYSTOLIC BLOOD PRESSURE: 144 MMHG
OHS CV CPX PERCENT MAX PREDICTED HEART RATE ACHIEVED: 66
OHS CV CPX RATE PRESSURE PRODUCT PRESENTING: 7194
STRESS ANGINA INDEX: 0
STRESS ECHO POST EXERCISE DUR MIN: 4 MINUTES
STRESS ECHO POST EXERCISE DUR SEC: 14 SECONDS
SYSTOLIC BLOOD PRESSURE: 109 MMHG

## 2020-09-21 ENCOUNTER — TELEPHONE (OUTPATIENT)
Dept: CARDIOLOGY | Facility: CLINIC | Age: 55
End: 2020-09-21

## 2020-09-21 NOTE — TELEPHONE ENCOUNTER
Patient wants to go back to work can she go? Patient said she is ready to do stress test also. Can we schedule stress first then see her in office afterwards? Please advise.

## 2020-09-21 NOTE — TELEPHONE ENCOUNTER
----- Message from Srinath Ruvalcaba sent at 9/21/2020  9:10 AM CDT -----  Regarding: NP hosp  Pt was in hospital never saw kote in office   Attempted stress but was too sick is feeling better now and would llike to attempt the stress   Advised we had to make her an appt first   Next available is October 21   [phone]

## 2020-09-23 ENCOUNTER — TELEPHONE (OUTPATIENT)
Dept: CARDIOLOGY | Facility: CLINIC | Age: 55
End: 2020-09-23

## 2020-09-23 ENCOUNTER — OFFICE VISIT (OUTPATIENT)
Dept: FAMILY MEDICINE | Facility: CLINIC | Age: 55
End: 2020-09-23
Payer: COMMERCIAL

## 2020-09-23 VITALS
HEART RATE: 74 BPM | TEMPERATURE: 98 F | DIASTOLIC BLOOD PRESSURE: 70 MMHG | RESPIRATION RATE: 18 BRPM | OXYGEN SATURATION: 99 % | BODY MASS INDEX: 24.07 KG/M2 | WEIGHT: 135.88 LBS | HEIGHT: 63 IN | SYSTOLIC BLOOD PRESSURE: 114 MMHG

## 2020-09-23 DIAGNOSIS — Z12.31 BREAST CANCER SCREENING BY MAMMOGRAM: ICD-10-CM

## 2020-09-23 DIAGNOSIS — Z12.11 COLON CANCER SCREENING: ICD-10-CM

## 2020-09-23 DIAGNOSIS — Z87.898 HISTORY OF CHEST PAIN: ICD-10-CM

## 2020-09-23 DIAGNOSIS — E78.5 HYPERLIPIDEMIA, UNSPECIFIED HYPERLIPIDEMIA TYPE: ICD-10-CM

## 2020-09-23 DIAGNOSIS — Z23 FLU VACCINE NEED: ICD-10-CM

## 2020-09-23 DIAGNOSIS — Z09 HOSPITAL DISCHARGE FOLLOW-UP: Primary | ICD-10-CM

## 2020-09-23 PROCEDURE — 90686 IIV4 VACC NO PRSV 0.5 ML IM: CPT | Mod: S$GLB,,, | Performed by: NURSE PRACTITIONER

## 2020-09-23 PROCEDURE — 90686 FLU VACCINE (QUAD) GREATER THAN OR EQUAL TO 3YO PRESERVATIVE FREE IM: ICD-10-PCS | Mod: S$GLB,,, | Performed by: NURSE PRACTITIONER

## 2020-09-23 PROCEDURE — 90471 IMMUNIZATION ADMIN: CPT | Mod: S$GLB,,, | Performed by: NURSE PRACTITIONER

## 2020-09-23 PROCEDURE — 1111F PR DISCHARGE MEDS RECONCILED W/ CURRENT OUTPATIENT MED LIST: ICD-10-PCS | Mod: S$GLB,,, | Performed by: NURSE PRACTITIONER

## 2020-09-23 PROCEDURE — 3008F BODY MASS INDEX DOCD: CPT | Mod: S$GLB,,, | Performed by: NURSE PRACTITIONER

## 2020-09-23 PROCEDURE — 99203 PR OFFICE/OUTPT VISIT, NEW, LEVL III, 30-44 MIN: ICD-10-PCS | Mod: 25,S$GLB,, | Performed by: NURSE PRACTITIONER

## 2020-09-23 PROCEDURE — 90471 FLU VACCINE (QUAD) GREATER THAN OR EQUAL TO 3YO PRESERVATIVE FREE IM: ICD-10-PCS | Mod: S$GLB,,, | Performed by: NURSE PRACTITIONER

## 2020-09-23 PROCEDURE — 1111F DSCHRG MED/CURRENT MED MERGE: CPT | Mod: S$GLB,,, | Performed by: NURSE PRACTITIONER

## 2020-09-23 PROCEDURE — 3008F PR BODY MASS INDEX (BMI) DOCUMENTED: ICD-10-PCS | Mod: S$GLB,,, | Performed by: NURSE PRACTITIONER

## 2020-09-23 PROCEDURE — 99203 OFFICE O/P NEW LOW 30 MIN: CPT | Mod: 25,S$GLB,, | Performed by: NURSE PRACTITIONER

## 2020-09-23 RX ORDER — PANTOPRAZOLE SODIUM 20 MG/1
20 TABLET, DELAYED RELEASE ORAL
Qty: 90 TABLET | Refills: 1 | Status: SHIPPED | OUTPATIENT
Start: 2020-09-23 | End: 2020-12-23

## 2020-09-23 NOTE — PROGRESS NOTES
SUBJECTIVE:      Patient ID: Lina King is a 55 y.o. female.    Chief Complaint: Establish Care and Follow-up (hospital discharge)    55 y.o. WF presents to establish care and for hospital follow up. She has a PMHx of actinic keratosis, anxiety, insomnia, allergic rhinitis, intermittent asthma, basal cell carcinoma (excised 9/24/13), hyperlipidemia, kidney calculi, and recurrent UTI. She initially presented to [facility] on 9/16/2020 with a complaint of chest pain which started on Saturday and mostly occurred at night. She reports a squeezing feeling to her heart which occurred intermittently. When it became more frequent and intensities got worse she presented to ER. The chest tightness resolved with nitroglycerin but patient continued to feel episodes. She does report a history of hyperlipidemia which she was started on a statin for in the hospital. She reports she had been told this prior to her hospitalization and was attempting lifestyle changes. Her labs were insignificant and her CXR showed no acute cardio or pulmonary processes. EKG showed sinus rhythm, no acute ST or T abnormality noted. She was admitted observation for serial cardiac enzymes and they attempted a stress test after the 2nd enzymes were negative. Cardiology was consulted while she was inpatient. During the stress test, patient was unable to perform exercise and became weak and felt like she was going to faint and was nauseated so she was returned to her room where she had 1 episode of vomiting (bilious) and was given nausea medication.  She was also given a GI cocktail. She was discharged on 9/17/2020 after she was able to eat and prescribed Protonix and nitroglycerin and she was told cardiology would arrange an outpatient Lexiscan. She reports she has contacted the cardiology clinic twice and did not receive an appointment for a visit or the Lexiscan. She is now upset because she needs a note to return her to work. 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 She reports she has been driving since she was discharged without any issues. She feels better and denies any further episodes of nausea, vomiting, or CP. She also denies palpitations, dizziness, SOB, and weakness. We reviewed her labs together which were normal upon discharge. She continues on the Crestor with no reported side effects. She is overdue for her mammogram and colonoscopy. She is UTD on her Tdap and pap smear. She would like to receive the flu vaccine today. Denies CP, SOB, wheezing, fevers, nausea, vomiting, diarrhea, constipation, numbness, weakness, dizziness, palpitations, or any other concerns at this time.        Past Surgical History:   Procedure Laterality Date    anal fissure      APPENDECTOMY      basal cell removal      breast implants       SECTION, LOW TRANSVERSE      INCISION OF BLADDER NECK CONTRACTURE       Family History   Problem Relation Age of Onset    ALS Mother     Cancer Father         brain    Alcohol abuse Father     Thyroid disease Sister     Melanoma Sister       Social History     Socioeconomic History    Marital status:      Spouse name: Not on file    Number of children: Not on file    Years of education: Not on file    Highest education level: Not on file   Occupational History    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     Employer: [employer]   Social Needs    Financial resource strain: Not on file    Food insecurity     Worry: Not on file     Inability: Not on file    Transportation needs     Medical: Not on file     Non-medical: Not on file   Tobacco Use    Smoking status: Never Smoker    Smokeless tobacco: Never Used   Substance and Sexual Activity    Alcohol use: No     Comment: rare    Drug use: Never    Sexual activity: Yes     Partners: Male   Lifestyle    Physical activity     Days per week: Not on file     Minutes per session: Not on file    Stress: Not at all    Relationships    Social connections     Talks on phone: Not on file     Gets together: Not on file     Attends [employer] service: Not on file     Active member of club or organization: Not on file     Attends meetings of clubs or organizations: Not on file     Relationship status: Not on file   Other Topics Concern    Are you pregnant or think you may be? No    Breast-feeding No   Social History Narrative    Not on file     Current Outpatient Medications   Medication Sig Dispense Refill    aspirin (ECOTRIN) 81 MG EC tablet Take 1 tablet (81 mg total) by mouth once daily.  0    nitroGLYCERIN (NITROSTAT) 0.4 MG SL tablet Place 1 tablet (0.4 mg total) under the tongue every 5 (five) minutes as needed for Chest pain. 90 tablet 0    pantoprazole (PROTONIX) 20 MG tablet Take 1 tablet (20 mg total) by mouth before breakfast. 90 tablet 1    rosuvastatin (CRESTOR) 20 MG tablet Take 1 tablet (20 mg total) by mouth once daily. 30 tablet 3     No current facility-administered medications for this visit.      Review of patient's allergies indicates:   Allergen Reactions    Penicillins Other (See Comments)     As a child      Past Medical History:   Diagnosis Date    Actinic keratosis     AK (actinic keratosis)     Allergic rhinitis     Allergy     Asthma, intermittent     Basal cell cancer ed&c in [place]    chest, right upper lateral thigh    Basal cell carcinoma excised 13    R lateral thigh    BCC (basal cell carcinoma of skin) excised 9/10/13    right lower abdomen    Hyperlipidemia     Keloid cicatrix     Kidney calculi     in early s    Recurrent UTI      Past Surgical History:   Procedure Laterality Date    anal fissure      APPENDECTOMY      basal cell removal      breast implants       SECTION, LOW TRANSVERSE      INCISION OF BLADDER NECK CONTRACTURE         Review of Systems   Constitutional: Negative for activity change, appetite change, chills, fatigue, fever and  "unexpected weight change.   HENT: Negative for congestion, ear pain, postnasal drip, rhinorrhea, sinus pressure, sinus pain, sneezing and sore throat.    Eyes: Negative for pain, discharge and visual disturbance.   Respiratory: Negative for cough, chest tightness, shortness of breath and wheezing.    Cardiovascular: Negative for chest pain, palpitations and leg swelling.   Gastrointestinal: Negative for abdominal distention, abdominal pain, blood in stool, constipation, diarrhea, nausea and vomiting.   Genitourinary: Negative for difficulty urinating, flank pain, frequency, hematuria, pelvic pain, urgency and vaginal discharge.   Musculoskeletal: Negative for back pain, joint swelling and myalgias.   Skin: Negative for color change, rash and wound.   Neurological: Negative for dizziness, seizures, syncope, weakness, light-headedness, numbness and headaches.   Hematological: Negative for adenopathy.   Psychiatric/Behavioral: Negative for agitation, confusion, hallucinations, sleep disturbance and suicidal ideas. The patient is not nervous/anxious.       OBJECTIVE:      Vitals:    09/23/20 1301   BP: 114/70   BP Location: Right arm   Patient Position: Sitting   BP Method: Medium (Automatic)   Pulse: 74   Resp: 18   Temp: 98.2 °F (36.8 °C)   TempSrc: Temporal   SpO2: 99%   Weight: 61.6 kg (135 lb 14.4 oz)   Height: 5' 3" (1.6 m)     Physical Exam  Vitals signs reviewed.   Constitutional:       General: She is not in acute distress.     Appearance: Normal appearance. She is well-developed and normal weight. She is not diaphoretic.   HENT:      Head: Normocephalic and atraumatic.      Right Ear: Hearing, tympanic membrane, ear canal and external ear normal.      Left Ear: Hearing, tympanic membrane, ear canal and external ear normal.      Nose: Nose normal. No mucosal edema, congestion or rhinorrhea.      Mouth/Throat:      Lips: Pink.      Mouth: Mucous membranes are moist.      Pharynx: Oropharynx is clear. Uvula " midline. No pharyngeal swelling, oropharyngeal exudate or posterior oropharyngeal erythema.   Eyes:      General: Lids are normal. No scleral icterus.        Right eye: No discharge.         Left eye: No discharge.      Extraocular Movements: Extraocular movements intact.      Conjunctiva/sclera: Conjunctivae normal.      Pupils: Pupils are equal, round, and reactive to light.   Neck:      Musculoskeletal: Full passive range of motion without pain, normal range of motion and neck supple.      Thyroid: No thyroid mass or thyromegaly.      Vascular: No carotid bruit.      Trachea: Trachea and phonation normal. No tracheal deviation.   Cardiovascular:      Rate and Rhythm: Normal rate and regular rhythm.      Pulses: Normal pulses.      Heart sounds: Normal heart sounds. No murmur. No friction rub. No gallop.    Pulmonary:      Effort: Pulmonary effort is normal. No respiratory distress.      Breath sounds: Normal breath sounds. No stridor. No decreased breath sounds, wheezing, rhonchi or rales.   Abdominal:      General: Bowel sounds are normal. There is no distension or abdominal bruit.      Palpations: Abdomen is soft. There is no hepatomegaly, splenomegaly or mass.      Tenderness: There is no abdominal tenderness. There is no guarding or rebound.      Hernia: No hernia is present.   Musculoskeletal: Normal range of motion.      Right lower leg: No edema.      Left lower leg: No edema.   Lymphadenopathy:      Cervical: No cervical adenopathy.      Upper Body:      Right upper body: No supraclavicular adenopathy.      Left upper body: No supraclavicular adenopathy.   Skin:     General: Skin is warm and dry.      Capillary Refill: Capillary refill takes less than 2 seconds.      Findings: No rash.   Neurological:      General: No focal deficit present.      Mental Status: She is alert and oriented to person, place, and time.      GCS: GCS eye subscore is 4. GCS verbal subscore is 5. GCS motor subscore is 6.       Cranial Nerves: Cranial nerves are intact.      Sensory: Sensation is intact.      Motor: Motor function is intact.      Coordination: Coordination is intact.      Gait: Gait is intact.   Psychiatric:         Attention and Perception: Attention and perception normal.         Mood and Affect: Mood and affect normal.         Speech: Speech normal.         Behavior: Behavior normal. Behavior is cooperative.         Thought Content: Thought content normal. Thought content does not include suicidal plan.         Cognition and Memory: Cognition and memory normal.         Judgment: Judgment normal.        Assessment:       1. Hospital discharge follow-up    2. History of chest pain    3. Hyperlipidemia, unspecified hyperlipidemia type    4. Breast cancer screening by mammogram    5. Colon cancer screening    6. Flu vaccine need        Plan:       Hospital discharge follow-up  Pt's hospital course was reviewed. Recommendations for follow up visits were discussed and reconciled. Referrals placed as needed. Discharge and current medications have been reviewed and reconciled with the chart. She would like a new referral to cardiology since unable to get in touch with the cardiology office who consulted in the hospital and this was placed today. Discussed the need for appropriate follow up to complete the cardiac workup. Verbalized understanding. A letter was given for her to return to work since she has had no further symptoms.  -     Ambulatory referral/consult to Cardiology; Future; Expected date: 09/30/2020    History of chest pain  She was started on Protonix while in the hospital and she was instructed to continue on this medication until the cardiac workup is completed. Verbalized understanding.  -     Ambulatory referral/consult to Cardiology; Future; Expected date: 09/30/2020  -     pantoprazole (PROTONIX) 20 MG tablet; Take 1 tablet (20 mg total) by mouth before breakfast.  Dispense: 90 tablet; Refill:  1    Hyperlipidemia, unspecified hyperlipidemia type  Will check liver function and lipid panel in 3 months for effectiveness. F/U in 3 months with labs.  -     Lipid Panel; Future; Expected date: 12/14/2020  -     Hepatic function panel; Future; Expected date: 12/14/2020    Breast cancer screening by mammogram  -     Mammo Digital Screening Bilat; Future; Expected date: 09/23/2020    Colon cancer screening  -     Ambulatory referral/consult to Gastroenterology; Future; Expected date: 09/30/2020    Flu vaccine need  -     Influenza - Quadrivalent *Preferred* (6 months+) (PF)        Follow up in about 3 months (around 12/23/2020) for F/U cholesterol.      9/24/2020 LOS Freitas, FNP

## 2020-09-23 NOTE — TELEPHONE ENCOUNTER
Spoke with patient and offered to schedule stress test in our office. Informed pt that pcp put referral in for Dr. Quiñonez if she wants to see Dr. Quiñonez that's fine we are just trying to see if she wanted to continue to see Dr. MCCRACKEN and schedule stress test or go to Dr. Quiñonez. Patient stated she would just go see Dr. Quiñonez. Told pt that is fine I would let Dr. MCCRACKEN know.     Mariola Brito MA  9/23/2020  5:27 PM

## 2020-09-23 NOTE — TELEPHONE ENCOUNTER
Spoke with Linette at [facility]. Patient had came up to our office yesterday to get clearance back to work. Patient was advised per KP nausea is PCP related and she needed to see PCP for a release. Patient verbal understanding yesterday. Informed patient we would schedule stress and give her a call back regarding lexiscan.     Informed Linette and she said she would let NP know that we did speak with her regarding stress and going back to work but pt is a new patient for them.

## 2020-09-23 NOTE — LETTER
September 23, 2020      [facility]  [address]  [facility] [address]  Phone: [phone]  Fax: [phone]       Patient: Lina King   YOB: 1965  Date of Visit: 09/23/2020    To Whom It May Concern:    Fco King  was at [facility] on 09/23/2020. She may return to work on 09/24/2020 with no restrictions. If you have any questions or concerns, or if I can be of further assistance, please do not hesitate to contact me.    Sincerely,        RAMIREZ Iverson

## 2020-09-23 NOTE — TELEPHONE ENCOUNTER
----- Message from Srinath Ruvalcaba sent at 9/23/2020 10:40 AM CDT -----  Regarding: Back to work  [employer] nurse practicianer calling in reference to note in epic that was sent asking if pt can go back to work because pt went to them trying to get an ok since she hadnt hear back from our office     [phone] option [phone] Aileen

## 2020-09-24 NOTE — PATIENT INSTRUCTIONS

## 2020-10-07 ENCOUNTER — HOSPITAL ENCOUNTER (OUTPATIENT)
Dept: RADIOLOGY | Facility: HOSPITAL | Age: 55
Discharge: HOME | End: 2020-10-07
Attending: NURSE PRACTITIONER
Payer: COMMERCIAL

## 2020-10-07 VITALS — HEIGHT: 63 IN | WEIGHT: 135.81 LBS | BODY MASS INDEX: 24.06 KG/M2

## 2020-10-07 DIAGNOSIS — Z12.31 BREAST CANCER SCREENING BY MAMMOGRAM: ICD-10-CM

## 2020-10-07 PROCEDURE — 77067 SCR MAMMO BI INCL CAD: CPT | Mod: TC,PO

## 2020-10-08 ENCOUNTER — TELEPHONE (OUTPATIENT)
Dept: FAMILY MEDICINE | Facility: CLINIC | Age: 55
End: 2020-10-08

## 2020-10-08 NOTE — PROGRESS NOTES
Please call patient. Mammogram is normal showing no abnormalities at this time. Can continue with yearly mammogram screenings.

## 2020-10-08 NOTE — TELEPHONE ENCOUNTER
----- Message from RAMIREZ Iverson sent at 10/8/2020  9:34 AM CDT -----  Please call patient. Mammogram is normal showing no abnormalities at this time. Can continue with yearly mammogram screenings.

## 2020-12-18 ENCOUNTER — TELEPHONE (OUTPATIENT)
Dept: FAMILY MEDICINE | Facility: CLINIC | Age: 55
End: 2020-12-18

## 2020-12-18 LAB
ALBUMIN SERPL-MCNC: 4.9 G/DL (ref 3.8–4.9)
ALP SERPL-CCNC: 87 IU/L (ref 39–117)
ALT SERPL-CCNC: 43 IU/L (ref 0–32)
AST SERPL-CCNC: 36 IU/L (ref 0–40)
BILIRUB DIRECT SERPL-MCNC: 0.16 MG/DL (ref 0–0.4)
BILIRUB SERPL-MCNC: 0.6 MG/DL (ref 0–1.2)
CHOLEST SERPL-MCNC: 188 MG/DL (ref 100–199)
HDLC SERPL-MCNC: 93 MG/DL
LDLC SERPL CALC-MCNC: 83 MG/DL (ref 0–99)
PROT SERPL-MCNC: 6.9 G/DL (ref 6–8.5)
TRIGL SERPL-MCNC: 63 MG/DL (ref 0–149)
VLDLC SERPL CALC-MCNC: 12 MG/DL (ref 5–40)

## 2020-12-18 NOTE — TELEPHONE ENCOUNTER
----- Message from RAMIREZ Iverson sent at 12/18/2020  8:17 AM CST -----  Please call patient. Cholesterol is much improved on the medication. Will discuss further at next visit.

## 2020-12-18 NOTE — PROGRESS NOTES
Please call patient. Cholesterol is much improved on the medication. Will discuss further at next visit.

## 2020-12-23 ENCOUNTER — OFFICE VISIT (OUTPATIENT)
Dept: FAMILY MEDICINE | Facility: CLINIC | Age: 55
End: 2020-12-23
Payer: COMMERCIAL

## 2020-12-23 VITALS
WEIGHT: 139.13 LBS | RESPIRATION RATE: 18 BRPM | TEMPERATURE: 98 F | BODY MASS INDEX: 24.65 KG/M2 | HEART RATE: 81 BPM | SYSTOLIC BLOOD PRESSURE: 110 MMHG | DIASTOLIC BLOOD PRESSURE: 70 MMHG | OXYGEN SATURATION: 98 % | HEIGHT: 63 IN

## 2020-12-23 DIAGNOSIS — Z29.9 PREVENTIVE MEASURE: ICD-10-CM

## 2020-12-23 DIAGNOSIS — Z12.11 COLON CANCER SCREENING: ICD-10-CM

## 2020-12-23 DIAGNOSIS — Z87.898 HISTORY OF CHEST PAIN: ICD-10-CM

## 2020-12-23 DIAGNOSIS — E78.2 MIXED HYPERLIPIDEMIA: Primary | ICD-10-CM

## 2020-12-23 PROCEDURE — 99213 PR OFFICE/OUTPT VISIT, EST, LEVL III, 20-29 MIN: ICD-10-PCS | Mod: S$GLB,,, | Performed by: NURSE PRACTITIONER

## 2020-12-23 PROCEDURE — 3008F BODY MASS INDEX DOCD: CPT | Mod: S$GLB,,, | Performed by: NURSE PRACTITIONER

## 2020-12-23 PROCEDURE — 1126F PR PAIN SEVERITY QUANTIFIED, NO PAIN PRESENT: ICD-10-PCS | Mod: S$GLB,,, | Performed by: NURSE PRACTITIONER

## 2020-12-23 PROCEDURE — 1126F AMNT PAIN NOTED NONE PRSNT: CPT | Mod: S$GLB,,, | Performed by: NURSE PRACTITIONER

## 2020-12-23 PROCEDURE — 3008F PR BODY MASS INDEX (BMI) DOCUMENTED: ICD-10-PCS | Mod: S$GLB,,, | Performed by: NURSE PRACTITIONER

## 2020-12-23 PROCEDURE — 99213 OFFICE O/P EST LOW 20 MIN: CPT | Mod: S$GLB,,, | Performed by: NURSE PRACTITIONER

## 2020-12-23 RX ORDER — ROSUVASTATIN CALCIUM 20 MG/1
20 TABLET, COATED ORAL NIGHTLY
Qty: 90 TABLET | Refills: 2 | Status: SHIPPED | OUTPATIENT
Start: 2020-12-23 | End: 2021-10-14 | Stop reason: SDUPTHER

## 2020-12-23 NOTE — PROGRESS NOTES
SUBJECTIVE:      Patient ID: Lina King is a 55 y.o. female.    Chief Complaint: Follow-up (3 month f/u Lipids )    Pt presents for F/U cholesterol. She has been doing well on the Crestor without any side effects reported. She does not endorse eating excessive fatty, salty, or fried foods. Her labs were reviewed today and looked good with a significant improvement of her cholesterol. She has not made the cardiology appointment as recommended yet because she reports she was unable to take time off from work. She is planning to make an appointment in January. She also reports she saw Dr. Bar who will not complete her colonoscopy until she gets clearance from cardiology. She stopped the Protonix because she was allergic to it and does still have nitroglycerin available PRN to her. She has not had chest pain since her admission to the hospital reportedly. Denies CP, SOB, wheezing, fevers, nausea, vomiting, diarrhea, constipation, numbness, weakness, dizziness, palpitations, or any other concerns at this time.    Hyperlipidemia  This is a chronic problem. The current episode started more than 1 month ago. The problem is controlled. Recent lipid tests were reviewed and are normal. She has no history of chronic renal disease, diabetes, hypothyroidism, liver disease, obesity or nephrotic syndrome. There are no known factors aggravating her hyperlipidemia. Pertinent negatives include no chest pain, focal sensory loss, focal weakness, leg pain, myalgias or shortness of breath. Current antihyperlipidemic treatment includes diet change and statins. The current treatment provides significant improvement of lipids. Compliance problems include adherence to exercise.  Risk factors for coronary artery disease include dyslipidemia and post-menopausal.       Past Surgical History:   Procedure Laterality Date    anal fissure      APPENDECTOMY      AUGMENTATION OF BREAST      basal cell removal      breast implants        SECTION, LOW TRANSVERSE      INCISION OF BLADDER NECK CONTRACTURE       Family History   Problem Relation Age of Onset    ALS Mother     Cancer Father         brain    Alcohol abuse Father     Thyroid disease Sister     Melanoma Sister     Breast cancer Paternal Aunt       Social History     Socioeconomic History    Marital status:      Spouse name: Not on file    Number of children: Not on file    Years of education: Not on file    Highest education level: Not on file   Occupational History    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     Employer: [employer]   Social Needs    Financial resource strain: Not on file    Food insecurity     Worry: Not on file     Inability: Not on file    Transportation needs     Medical: Not on file     Non-medical: Not on file   Tobacco Use    Smoking status: Never Smoker    Smokeless tobacco: Never Used   Substance and Sexual Activity    Alcohol use: No     Comment: rare    Drug use: Never    Sexual activity: Yes     Partners: Male   Lifestyle    Physical activity     Days per week: Not on file     Minutes per session: Not on file    Stress: Not at all   Relationships    Social connections     Talks on phone: Not on file     Gets together: Not on file     Attends Baptism service: Not on file     Active member of club or organization: Not on file     Attends meetings of clubs or organizations: Not on file     Relationship status: Not on file   Other Topics Concern    Are you pregnant or think you may be? No    Breast-feeding No   Social History Narrative    Not on file     Current Outpatient Medications   Medication Sig Dispense Refill    aspirin (ECOTRIN) 81 MG EC tablet Take 1 tablet (81 mg total) by mouth once daily.  0    nitroGLYCERIN (NITROSTAT) 0.4 MG SL tablet Place 1 tablet (0.4 mg total) under the tongue every 5 (five) minutes as needed for Chest pain. 90 tablet 0    rosuvastatin (CRESTOR) 20 MG tablet Take 1 tablet (20  mg total) by mouth every evening. 90 tablet 2     No current facility-administered medications for this visit.      Review of patient's allergies indicates:   Allergen Reactions    Pantoprazole Hives    Penicillins Other (See Comments)     As a child      Past Medical History:   Diagnosis Date    Actinic keratosis     AK (actinic keratosis)     Allergic rhinitis     Allergy     Asthma, intermittent     Basal cell cancer ed&c in [place]    chest, right upper lateral thigh    Basal cell carcinoma excised 13    R lateral thigh    BCC (basal cell carcinoma of skin) excised 9/10/13    right lower abdomen    Hyperlipidemia     Keloid cicatrix     Kidney calculi     in early     Recurrent UTI      Past Surgical History:   Procedure Laterality Date    anal fissure      APPENDECTOMY      AUGMENTATION OF BREAST      basal cell removal      breast implants       SECTION, LOW TRANSVERSE      INCISION OF BLADDER NECK CONTRACTURE         Review of Systems   Constitutional: Negative for activity change, appetite change, chills, fatigue, fever and unexpected weight change.   HENT: Negative for congestion, ear pain, postnasal drip, rhinorrhea, sinus pressure, sinus pain, sneezing and sore throat.    Eyes: Negative for pain, discharge and visual disturbance.   Respiratory: Negative for cough, chest tightness, shortness of breath and wheezing.    Cardiovascular: Negative for chest pain, palpitations and leg swelling.   Gastrointestinal: Negative for abdominal distention, abdominal pain, blood in stool, constipation, diarrhea, nausea and vomiting.   Genitourinary: Negative for difficulty urinating, flank pain, frequency, hematuria, pelvic pain, urgency and vaginal discharge.   Musculoskeletal: Negative for back pain, joint swelling and myalgias.   Skin: Negative for color change, rash and wound.   Neurological: Negative for dizziness, focal weakness, seizures, syncope, weakness, light-headedness,  "numbness and headaches.   Hematological: Negative for adenopathy.   Psychiatric/Behavioral: Negative for agitation, confusion, dysphoric mood, hallucinations, sleep disturbance and suicidal ideas. The patient is not nervous/anxious.       OBJECTIVE:      Vitals:    12/23/20 0932   BP: 110/70   BP Location: Left arm   Patient Position: Sitting   BP Method: Medium (Manual)   Pulse: 81   Resp: 18   Temp: 98.4 °F (36.9 °C)   TempSrc: Temporal   SpO2: 98%   Weight: 63.1 kg (139 lb 1.6 oz)   Height: 5' 3" (1.6 m)     Physical Exam  Vitals signs reviewed.   Constitutional:       General: She is not in acute distress.     Appearance: Normal appearance. She is well-developed and normal weight. She is not diaphoretic.   HENT:      Head: Normocephalic and atraumatic.      Right Ear: Hearing and external ear normal.      Left Ear: Hearing and external ear normal.      Nose: Nose normal.      Mouth/Throat:      Lips: Pink.      Mouth: Mucous membranes are moist.   Eyes:      General: Lids are normal. No scleral icterus.        Right eye: No discharge.         Left eye: No discharge.      Extraocular Movements: Extraocular movements intact.      Conjunctiva/sclera: Conjunctivae normal.      Pupils: Pupils are equal, round, and reactive to light.   Neck:      Musculoskeletal: Full passive range of motion without pain and normal range of motion.      Trachea: Trachea and phonation normal. No tracheal deviation.   Cardiovascular:      Rate and Rhythm: Normal rate and regular rhythm.      Pulses: Normal pulses.      Heart sounds: Normal heart sounds. No murmur. No friction rub. No gallop.    Pulmonary:      Effort: Pulmonary effort is normal. No respiratory distress.      Breath sounds: Normal breath sounds. No stridor. No decreased breath sounds, wheezing, rhonchi or rales.   Abdominal:      General: Bowel sounds are normal.      Palpations: Abdomen is soft.      Tenderness: There is no abdominal tenderness.   Musculoskeletal: " Normal range of motion.      Right lower leg: No edema.      Left lower leg: No edema.   Skin:     General: Skin is warm and dry.      Capillary Refill: Capillary refill takes less than 2 seconds.      Findings: No rash.   Neurological:      General: No focal deficit present.      Mental Status: She is alert and oriented to person, place, and time.      Coordination: Coordination is intact.      Gait: Gait is intact.   Psychiatric:         Attention and Perception: Attention and perception normal.         Mood and Affect: Mood and affect normal.         Speech: Speech normal.         Behavior: Behavior normal. Behavior is cooperative.         Thought Content: Thought content normal. Thought content does not include suicidal plan.         Cognition and Memory: Cognition and memory normal.         Judgment: Judgment normal.        Assessment:       1. Mixed hyperlipidemia    2. History of chest pain    3. Colon cancer screening    4. Preventive measure        Plan:       Mixed hyperlipidemia  Diagnosis is stable on current regimen. Continue current medications. Refills given as previously prescribed. F/U in 6 months for annual visit with labs.  -     rosuvastatin (CRESTOR) 20 MG tablet; Take 1 tablet (20 mg total) by mouth every evening.  Dispense: 90 tablet; Refill: 2  -     Comprehensive Metabolic Panel; Future; Expected date: 06/01/2021  -     Lipid Panel; Future; Expected date: 06/01/2021    History of chest pain  Instructed to continue with cardiology visit as recommended after hospitalization. Verbalized agreement and understanding.    Colon cancer screening  Needs cardiology clearance before Dr. Bar will do the colonoscopy.    Preventive measure  -     CBC Auto Differential; Future; Expected date: 06/01/2021  -     TSH; Future; Expected date: 06/01/2021  -     Urinalysis; Future; Expected date: 06/01/2021        Follow up in about 6 months (around 6/23/2021) for Annual Well Check or sooner as needed.       12/23/2020 Sanam Niño, LOS, FNP

## 2020-12-23 NOTE — PATIENT INSTRUCTIONS

## 2021-04-29 ENCOUNTER — PATIENT MESSAGE (OUTPATIENT)
Dept: RESEARCH | Facility: HOSPITAL | Age: 56
End: 2021-04-29

## 2021-10-14 DIAGNOSIS — E78.2 MIXED HYPERLIPIDEMIA: ICD-10-CM

## 2021-10-14 RX ORDER — ROSUVASTATIN CALCIUM 20 MG/1
20 TABLET, COATED ORAL NIGHTLY
Qty: 90 TABLET | Refills: 0 | Status: SHIPPED | OUTPATIENT
Start: 2021-10-14 | End: 2021-11-11 | Stop reason: SDUPTHER

## 2021-10-28 ENCOUNTER — TELEPHONE (OUTPATIENT)
Dept: FAMILY MEDICINE | Facility: CLINIC | Age: 56
End: 2021-10-28

## 2021-10-28 DIAGNOSIS — Z29.9 PREVENTIVE MEASURE: ICD-10-CM

## 2021-10-28 DIAGNOSIS — E78.2 MIXED HYPERLIPIDEMIA: Primary | ICD-10-CM

## 2021-11-02 LAB
ALBUMIN SERPL-MCNC: 4.7 G/DL (ref 3.8–4.9)
ALBUMIN/GLOB SERPL: 2.4 {RATIO} (ref 1.2–2.2)
ALP SERPL-CCNC: 97 IU/L (ref 44–121)
ALT SERPL-CCNC: 26 IU/L (ref 0–32)
APPEARANCE UR: CLEAR
AST SERPL-CCNC: 27 IU/L (ref 0–40)
BASOPHILS # BLD AUTO: 0 X10E3/UL (ref 0–0.2)
BASOPHILS NFR BLD AUTO: 0 %
BILIRUB SERPL-MCNC: 0.6 MG/DL (ref 0–1.2)
BILIRUB UR QL STRIP: NEGATIVE
BUN SERPL-MCNC: 11 MG/DL (ref 6–24)
BUN/CREAT SERPL: 14 (ref 9–23)
CALCIUM SERPL-MCNC: 10 MG/DL (ref 8.7–10.2)
CHLORIDE SERPL-SCNC: 102 MMOL/L (ref 96–106)
CHOLEST SERPL-MCNC: 191 MG/DL (ref 100–199)
CO2 SERPL-SCNC: 27 MMOL/L (ref 20–29)
COLOR UR: YELLOW
CREAT SERPL-MCNC: 0.76 MG/DL (ref 0.57–1)
EOSINOPHIL # BLD AUTO: 0.1 X10E3/UL (ref 0–0.4)
EOSINOPHIL NFR BLD AUTO: 1 %
ERYTHROCYTE [DISTWIDTH] IN BLOOD BY AUTOMATED COUNT: 11.2 % (ref 11.7–15.4)
GLOBULIN SER CALC-MCNC: 2 G/DL (ref 1.5–4.5)
GLUCOSE SERPL-MCNC: 87 MG/DL (ref 65–99)
GLUCOSE UR QL: NEGATIVE
HCT VFR BLD AUTO: 42.1 % (ref 34–46.6)
HDLC SERPL-MCNC: 80 MG/DL
HGB BLD-MCNC: 14.2 G/DL (ref 11.1–15.9)
HGB UR QL STRIP: NEGATIVE
IMM GRANULOCYTES # BLD AUTO: 0 X10E3/UL (ref 0–0.1)
IMM GRANULOCYTES NFR BLD AUTO: 0 %
KETONES UR QL STRIP: ABNORMAL
LDLC SERPL CALC-MCNC: 99 MG/DL (ref 0–99)
LEUKOCYTE ESTERASE UR QL STRIP: NEGATIVE
LYMPHOCYTES # BLD AUTO: 1.7 X10E3/UL (ref 0.7–3.1)
LYMPHOCYTES NFR BLD AUTO: 23 %
MCH RBC QN AUTO: 32.4 PG (ref 26.6–33)
MCHC RBC AUTO-ENTMCNC: 33.7 G/DL (ref 31.5–35.7)
MCV RBC AUTO: 96 FL (ref 79–97)
MICRO URNS: ABNORMAL
MONOCYTES # BLD AUTO: 0.5 X10E3/UL (ref 0.1–0.9)
MONOCYTES NFR BLD AUTO: 7 %
NEUTROPHILS # BLD AUTO: 5 X10E3/UL (ref 1.4–7)
NEUTROPHILS NFR BLD AUTO: 69 %
NITRITE UR QL STRIP: NEGATIVE
PH UR STRIP: 6 [PH] (ref 5–7.5)
PLATELET # BLD AUTO: 237 X10E3/UL (ref 150–450)
POTASSIUM SERPL-SCNC: 4.4 MMOL/L (ref 3.5–5.2)
PROT SERPL-MCNC: 6.7 G/DL (ref 6–8.5)
PROT UR QL STRIP: NEGATIVE
RBC # BLD AUTO: 4.38 X10E6/UL (ref 3.77–5.28)
SODIUM SERPL-SCNC: 143 MMOL/L (ref 134–144)
SP GR UR: 1.02 (ref 1–1.03)
TRIGL SERPL-MCNC: 67 MG/DL (ref 0–149)
TSH SERPL DL<=0.005 MIU/L-ACNC: 1.63 UIU/ML (ref 0.45–4.5)
UROBILINOGEN UR STRIP-MCNC: 0.2 MG/DL (ref 0.2–1)
VLDLC SERPL CALC-MCNC: 12 MG/DL (ref 5–40)
WBC # BLD AUTO: 7.3 X10E3/UL (ref 3.4–10.8)

## 2021-11-03 ENCOUNTER — TELEPHONE (OUTPATIENT)
Dept: FAMILY MEDICINE | Facility: CLINIC | Age: 56
End: 2021-11-03
Payer: COMMERCIAL

## 2021-11-11 ENCOUNTER — OFFICE VISIT (OUTPATIENT)
Dept: FAMILY MEDICINE | Facility: CLINIC | Age: 56
End: 2021-11-11
Payer: COMMERCIAL

## 2021-11-11 ENCOUNTER — TELEPHONE (OUTPATIENT)
Dept: FAMILY MEDICINE | Facility: CLINIC | Age: 56
End: 2021-11-11
Payer: COMMERCIAL

## 2021-11-11 VITALS
TEMPERATURE: 99 F | RESPIRATION RATE: 18 BRPM | DIASTOLIC BLOOD PRESSURE: 81 MMHG | HEIGHT: 63 IN | SYSTOLIC BLOOD PRESSURE: 126 MMHG | OXYGEN SATURATION: 97 % | WEIGHT: 153.81 LBS | BODY MASS INDEX: 27.25 KG/M2 | HEART RATE: 82 BPM

## 2021-11-11 DIAGNOSIS — Z12.31 BREAST CANCER SCREENING BY MAMMOGRAM: ICD-10-CM

## 2021-11-11 DIAGNOSIS — Z01.00 ENCOUNTER FOR VISION SCREENING: ICD-10-CM

## 2021-11-11 DIAGNOSIS — Z87.898 HISTORY OF CHEST PAIN: ICD-10-CM

## 2021-11-11 DIAGNOSIS — R94.39 ABNORMAL STRESS TEST: ICD-10-CM

## 2021-11-11 DIAGNOSIS — Z00.00 ANNUAL VISIT FOR GENERAL ADULT MEDICAL EXAMINATION WITHOUT ABNORMAL FINDINGS: Primary | ICD-10-CM

## 2021-11-11 DIAGNOSIS — Z12.4 PAP SMEAR FOR CERVICAL CANCER SCREENING: ICD-10-CM

## 2021-11-11 DIAGNOSIS — E66.3 OVERWEIGHT WITH BODY MASS INDEX (BMI) OF 27 TO 27.9 IN ADULT: ICD-10-CM

## 2021-11-11 DIAGNOSIS — E78.2 MIXED HYPERLIPIDEMIA: ICD-10-CM

## 2021-11-11 DIAGNOSIS — Z12.11 COLON CANCER SCREENING: ICD-10-CM

## 2021-11-11 PROCEDURE — 99396 PR PREVENTIVE VISIT,EST,40-64: ICD-10-PCS | Mod: S$GLB,,, | Performed by: NURSE PRACTITIONER

## 2021-11-11 PROCEDURE — 1160F PR REVIEW ALL MEDS BY PRESCRIBER/CLIN PHARMACIST DOCUMENTED: ICD-10-PCS | Mod: S$GLB,,, | Performed by: NURSE PRACTITIONER

## 2021-11-11 PROCEDURE — 99396 PREV VISIT EST AGE 40-64: CPT | Mod: S$GLB,,, | Performed by: NURSE PRACTITIONER

## 2021-11-11 PROCEDURE — 3008F PR BODY MASS INDEX (BMI) DOCUMENTED: ICD-10-PCS | Mod: S$GLB,,, | Performed by: NURSE PRACTITIONER

## 2021-11-11 PROCEDURE — 3008F BODY MASS INDEX DOCD: CPT | Mod: S$GLB,,, | Performed by: NURSE PRACTITIONER

## 2021-11-11 PROCEDURE — 1160F RVW MEDS BY RX/DR IN RCRD: CPT | Mod: S$GLB,,, | Performed by: NURSE PRACTITIONER

## 2021-11-11 RX ORDER — ROSUVASTATIN CALCIUM 40 MG/1
40 TABLET, COATED ORAL NIGHTLY
Qty: 90 TABLET | Refills: 1 | Status: SHIPPED | OUTPATIENT
Start: 2021-11-11 | End: 2022-05-11 | Stop reason: SDUPTHER

## 2021-11-29 ENCOUNTER — OFFICE VISIT (OUTPATIENT)
Dept: CARDIOLOGY | Facility: CLINIC | Age: 56
End: 2021-11-29
Payer: COMMERCIAL

## 2021-11-29 VITALS
SYSTOLIC BLOOD PRESSURE: 114 MMHG | WEIGHT: 151 LBS | DIASTOLIC BLOOD PRESSURE: 60 MMHG | OXYGEN SATURATION: 98 % | HEART RATE: 77 BPM | BODY MASS INDEX: 26.75 KG/M2 | HEIGHT: 63 IN

## 2021-11-29 DIAGNOSIS — R94.39 ABNORMAL STRESS TEST: ICD-10-CM

## 2021-11-29 DIAGNOSIS — J30.2 SEASONAL ALLERGIC RHINITIS, UNSPECIFIED TRIGGER: ICD-10-CM

## 2021-11-29 DIAGNOSIS — G47.00 INSOMNIA, UNSPECIFIED TYPE: ICD-10-CM

## 2021-11-29 DIAGNOSIS — E78.2 MIXED HYPERLIPIDEMIA: ICD-10-CM

## 2021-11-29 DIAGNOSIS — Z87.898 HISTORY OF CHEST PAIN: ICD-10-CM

## 2021-11-29 DIAGNOSIS — J45.20 INTERMITTENT ASTHMA, UNSPECIFIED ASTHMA SEVERITY, UNSPECIFIED WHETHER COMPLICATED: Primary | ICD-10-CM

## 2021-11-29 PROCEDURE — 99214 PR OFFICE/OUTPT VISIT, EST, LEVL IV, 30-39 MIN: ICD-10-PCS | Mod: S$GLB,,, | Performed by: INTERNAL MEDICINE

## 2021-11-29 PROCEDURE — 99214 OFFICE O/P EST MOD 30 MIN: CPT | Mod: S$GLB,,, | Performed by: INTERNAL MEDICINE

## 2021-12-09 ENCOUNTER — HOSPITAL ENCOUNTER (OUTPATIENT)
Dept: RADIOLOGY | Facility: HOSPITAL | Age: 56
Discharge: HOME | End: 2021-12-09
Attending: NURSE PRACTITIONER
Payer: COMMERCIAL

## 2021-12-09 DIAGNOSIS — Z12.31 BREAST CANCER SCREENING BY MAMMOGRAM: ICD-10-CM

## 2021-12-09 PROCEDURE — 77067 SCR MAMMO BI INCL CAD: CPT | Mod: TC,PO

## 2022-03-25 NOTE — ED NOTES
Bed rails are up and call light is within patient reach.   CONTINUES TO IMPROVE 1 2 2020 - CULT NEG - AC CLEAR - STILL VIT CELLS - CONT PF QID AND RE-EVAL IN 2 WKS.

## 2022-03-28 ENCOUNTER — OFFICE VISIT (OUTPATIENT)
Dept: CARDIOLOGY | Facility: CLINIC | Age: 57
End: 2022-03-28
Payer: COMMERCIAL

## 2022-03-28 VITALS
BODY MASS INDEX: 26.75 KG/M2 | WEIGHT: 151 LBS | HEIGHT: 63 IN | DIASTOLIC BLOOD PRESSURE: 60 MMHG | OXYGEN SATURATION: 98 % | SYSTOLIC BLOOD PRESSURE: 126 MMHG | HEART RATE: 70 BPM

## 2022-03-28 DIAGNOSIS — E78.2 MIXED HYPERLIPIDEMIA: Primary | ICD-10-CM

## 2022-03-28 PROCEDURE — 3008F PR BODY MASS INDEX (BMI) DOCUMENTED: ICD-10-PCS | Mod: CPTII,S$GLB,, | Performed by: INTERNAL MEDICINE

## 2022-03-28 PROCEDURE — 1159F MED LIST DOCD IN RCRD: CPT | Mod: CPTII,S$GLB,, | Performed by: INTERNAL MEDICINE

## 2022-03-28 PROCEDURE — 3074F SYST BP LT 130 MM HG: CPT | Mod: CPTII,S$GLB,, | Performed by: INTERNAL MEDICINE

## 2022-03-28 PROCEDURE — 1159F PR MEDICATION LIST DOCUMENTED IN MEDICAL RECORD: ICD-10-PCS | Mod: CPTII,S$GLB,, | Performed by: INTERNAL MEDICINE

## 2022-03-28 PROCEDURE — 3078F PR MOST RECENT DIASTOLIC BLOOD PRESSURE < 80 MM HG: ICD-10-PCS | Mod: CPTII,S$GLB,, | Performed by: INTERNAL MEDICINE

## 2022-03-28 PROCEDURE — 3074F PR MOST RECENT SYSTOLIC BLOOD PRESSURE < 130 MM HG: ICD-10-PCS | Mod: CPTII,S$GLB,, | Performed by: INTERNAL MEDICINE

## 2022-03-28 PROCEDURE — 3008F BODY MASS INDEX DOCD: CPT | Mod: CPTII,S$GLB,, | Performed by: INTERNAL MEDICINE

## 2022-03-28 PROCEDURE — 99204 OFFICE O/P NEW MOD 45 MIN: CPT | Mod: S$GLB,,, | Performed by: INTERNAL MEDICINE

## 2022-03-28 PROCEDURE — 3078F DIAST BP <80 MM HG: CPT | Mod: CPTII,S$GLB,, | Performed by: INTERNAL MEDICINE

## 2022-03-28 PROCEDURE — 99204 PR OFFICE/OUTPT VISIT, NEW, LEVL IV, 45-59 MIN: ICD-10-PCS | Mod: S$GLB,,, | Performed by: INTERNAL MEDICINE

## 2022-03-28 NOTE — PROGRESS NOTES
[facility]    Subjective:     Patient ID:  Lina King is a 57 y.o. female patient here for evaluation Follow-up (4 months)      HPI:  57-year-old female here for follow-up.  Patient has been followed up in Cardiology Clinic in the past for atypical chest pain which she no longer has which has been evaluated with the echocardiogram and stress echo during which she did not reach her target heart rate.  Patient does not report any chest pain or shortness of breath at home and has been extremely active at home and in the yd.    Review of Systems   All other systems reviewed and are negative.       Past Medical History:   Diagnosis Date    Actinic keratosis     AK (actinic keratosis)     Allergic rhinitis     Allergy     Asthma, intermittent     Basal cell cancer ed&c in [place]    chest, right upper lateral thigh    Basal cell carcinoma excised 13    R lateral thigh    BCC (basal cell carcinoma of skin) excised 9/10/13    right lower abdomen    Hyperlipidemia     Keloid cicatrix     Kidney calculi     in early 30's    Recurrent UTI        Past Surgical History:   Procedure Laterality Date    anal fissure      APPENDECTOMY      AUGMENTATION OF BREAST      basal cell removal      breast implants       SECTION, LOW TRANSVERSE      INCISION OF BLADDER NECK CONTRACTURE         Family History   Problem Relation Age of Onset    ALS Mother     Cancer Father         brain    Alcohol abuse Father     Thyroid disease Sister     Melanoma Sister     Breast cancer Paternal Aunt        Social History     Socioeconomic History    Marital status:    Occupational History    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 No    Breast-feeding No       Current Outpatient Medications   Medication Sig Dispense Refill    rosuvastatin (CRESTOR) 40 MG Tab Take 1 tablet (40 mg total) by mouth every evening. 90 tablet 1    nitroGLYCERIN (NITROSTAT) 0.4 MG SL tablet Place 1 tablet (0.4 mg total) under the tongue every 5 (five) minutes as needed for Chest pain. 90 tablet 0     No current facility-administered medications for this visit.       Review of patient's allergies indicates:   Allergen Reactions    Pantoprazole Hives    Penicillins Other (See Comments)     As a child         Objective:        Vitals:    03/28/22 1104   BP: 126/60   Pulse: 70       Physical Exam  Vitals reviewed.   Constitutional:       Appearance: Normal appearance.   HENT:      Mouth/Throat:      Mouth: Mucous membranes are moist.   Eyes:      Extraocular Movements: Extraocular movements intact.      Pupils: Pupils are equal, round, and reactive to light.   Cardiovascular:      Rate and Rhythm: Normal rate and regular rhythm.      Pulses: Normal pulses.      Heart sounds: Normal heart sounds. No murmur heard.    No gallop.   Pulmonary:      Effort: Pulmonary effort is normal.      Breath sounds: Normal breath sounds.   Abdominal:      General: Bowel sounds are normal.      Palpations: Abdomen is soft.   Musculoskeletal:         General: Normal range of motion.   Skin:     General: Skin is warm and dry.   Neurological:      General: No focal deficit present.      Mental Status: She is alert and oriented to person, place, and time.   Psychiatric:         Mood and Affect: Mood normal.         LIPIDS - LAST 2   Lab Results   Component Value Date    CHOL 191 11/01/2021    CHOL 188 12/17/2020    HDL 80 11/01/2021    HDL 93 12/17/2020    LDLCALC 99 11/01/2021    LDLCALC 83 12/17/2020    TRIG 67 11/01/2021    TRIG 63 12/17/2020    CHOLHDL 29.4 09/17/2020    CHOLHDL 25.5 06/14/2013       CBC - LAST 2  Lab  Results   Component Value Date    WBC 7.3 11/01/2021    WBC 7.67 09/17/2020    RBC 4.38 11/01/2021    RBC 4.32 09/17/2020    HGB 14.2 11/01/2021    HGB 13.9 09/17/2020    HCT 42.1 11/01/2021    HCT 40.7 09/17/2020    MCV 96 11/01/2021    MCV 94 09/17/2020    MCH 32.4 11/01/2021    MCH 32.2 (H) 09/17/2020    MCHC 33.7 11/01/2021    MCHC 34.2 09/17/2020    RDW 11.2 (L) 11/01/2021    RDW 11.0 (L) 09/17/2020     11/01/2021     09/17/2020    MPV 11.2 09/17/2020    MPV 12.3 09/16/2020    GRAN 5.0 09/17/2020    GRAN 65.0 09/17/2020    LYMPH 1.7 11/01/2021    LYMPH 2.1 09/17/2020    LYMPH 26.7 09/17/2020    MONO 7 11/01/2021    MONO 0.5 11/01/2021    BASO 0.0 11/01/2021    BASO 0.02 09/17/2020    NRBC 0 09/17/2020    NRBC 0 09/16/2020       CHEMISTRY & LIVER FUNCTION - LAST 2  Lab Results   Component Value Date     11/01/2021     09/17/2020    K 4.4 11/01/2021    K 3.6 09/17/2020     11/01/2021     09/17/2020    CO2 27 11/01/2021    CO2 26 09/17/2020    ANIONGAP 10 09/17/2020    ANIONGAP 11 09/16/2020    BUN 11 11/01/2021    BUN 14 09/17/2020    CREATININE 0.76 11/01/2021    CREATININE 0.6 09/17/2020    GLU 87 11/01/2021    GLU 84 09/17/2020    CALCIUM 10.0 11/01/2021    CALCIUM 9.3 09/17/2020    MG 2.1 09/17/2020    MG 2.1 09/16/2020    ALBUMIN 4.7 11/01/2021    ALBUMIN 4.9 12/17/2020    PROT 7.1 09/16/2020    PROT 7.0 06/14/2013    ALKPHOS 81 09/16/2020    ALKPHOS 81 06/14/2013    ALT 26 11/01/2021    ALT 43 (H) 12/17/2020    AST 27 11/01/2021    AST 36 12/17/2020    BILITOT 0.6 11/01/2021    BILITOT 0.6 12/17/2020        CARDIAC PROFILE - LAST 2  Lab Results   Component Value Date    BNP 36 09/16/2020    CPKMB 0.7 09/16/2020    TROPONINI <0.030 09/16/2020    TROPONINI <0.030 09/16/2020        COAGULATION - LAST 2  No results found for: LABPT, INR, APTT    ENDOCRINE & PSA - LAST 2  Lab Results   Component Value Date    TSH 1.630 11/01/2021    TSH 1.599 06/14/2013        ECHOCARDIOGRAM  RESULTS  Results for orders placed during the hospital encounter of 09/16/20    Stress Echo Which stress agent will be used? Treadmill Exercise; Color Flow Doppler? No    Interpretation Summary  · The test was stopped because the patient experienced lightheadedness. The patient requested the test to be stopped.  · Normal left ventricular systolic function. The estimated ejection fraction is 60%.  · No wall motion abnormalities.  · There were no arrhythmias during stress.  · The ECG portion of this study is negative for myocardial ischemia at workload achieved.  · Test was stopped as patient reported lightheadedness and felt like passing out and felt very nauseated. Her BP was stable at the time. ECG showed sinus tachycardia with no ischemic ST-T changes. Patient however was only able to achieve 63% of target heart rate.  · Recommended Lexiscan myoview stress test. Patient stated that she would like to wait and get this done as an outpatient.      CURRENT/PREVIOUS VISIT EKG  Results for orders placed or performed during the hospital encounter of 09/16/20   EKG 12-lead    Collection Time: 09/16/20  9:41 AM    Narrative    Test Reason : R07.9,    Vent. Rate : 071 BPM     Atrial Rate : 071 BPM     P-R Int : 134 ms          QRS Dur : 074 ms      QT Int : 408 ms       P-R-T Axes : 065 020 037 degrees     QTc Int : 443 ms    Normal sinus rhythm  Possible Left atrial enlargement  Low voltage QRS  Borderline Abnormal ECG  No previous ECGs available  Confirmed by Darci Santana MD ([id number]) on 9/19/2020 4:10:53 PM    Referred By:             Confirmed By:Darci Santana MD     No valid procedures specified.   No results found for this or any previous visit.    No valid procedures specified.    Her EKG in 2020 showed normal sinus rhythm without any acute ischemic changes.    Assessment:       1. Mixed hyperlipidemia           Plan:       Mixed hyperlipidemia     No chest pain.  Good exercise tolerance at home with no  cardiac symptoms.  LDL well controlled, continue current statin.  Check lipid panel and early with primary care physician.  Inform us if patient develops worsening exercise tolerance with chest pain or shortness of breath.  Discussed with her importance of diet and exercise for cardiovascular health and for management of hyperlipidemia.    Follow up in about 1 year (around 3/28/2023).          Abhishek Garg, MD [facility]

## 2022-05-11 ENCOUNTER — OFFICE VISIT (OUTPATIENT)
Dept: FAMILY MEDICINE | Facility: CLINIC | Age: 57
End: 2022-05-11
Payer: COMMERCIAL

## 2022-05-11 VITALS
DIASTOLIC BLOOD PRESSURE: 78 MMHG | HEIGHT: 63 IN | OXYGEN SATURATION: 99 % | HEART RATE: 83 BPM | TEMPERATURE: 98 F | BODY MASS INDEX: 24.36 KG/M2 | WEIGHT: 137.5 LBS | RESPIRATION RATE: 18 BRPM | SYSTOLIC BLOOD PRESSURE: 124 MMHG

## 2022-05-11 DIAGNOSIS — E78.2 MIXED HYPERLIPIDEMIA: ICD-10-CM

## 2022-05-11 DIAGNOSIS — Z00.00 HEALTHCARE MAINTENANCE: ICD-10-CM

## 2022-05-11 DIAGNOSIS — Z12.11 COLON CANCER SCREENING: ICD-10-CM

## 2022-05-11 DIAGNOSIS — M25.551 RIGHT HIP PAIN: ICD-10-CM

## 2022-05-11 DIAGNOSIS — R39.9 UTI SYMPTOMS: Primary | ICD-10-CM

## 2022-05-11 LAB
BILIRUB UR QL STRIP: NEGATIVE
GLUCOSE UR QL STRIP: NEGATIVE
KETONES UR QL STRIP: NEGATIVE
LEUKOCYTE ESTERASE UR QL STRIP: POSITIVE
PH, POC UA: 6
POC BLOOD, URINE: POSITIVE
POC NITRATES, URINE: NEGATIVE
PROT UR QL STRIP: POSITIVE
SP GR UR STRIP: 1.02 (ref 1–1.03)
UROBILINOGEN UR STRIP-ACNC: NORMAL (ref 0.1–1.1)

## 2022-05-11 PROCEDURE — 81003 URINALYSIS AUTO W/O SCOPE: CPT | Mod: QW,S$GLB,, | Performed by: NURSE PRACTITIONER

## 2022-05-11 PROCEDURE — 1160F PR REVIEW ALL MEDS BY PRESCRIBER/CLIN PHARMACIST DOCUMENTED: ICD-10-PCS | Mod: CPTII,S$GLB,, | Performed by: NURSE PRACTITIONER

## 2022-05-11 PROCEDURE — 3074F SYST BP LT 130 MM HG: CPT | Mod: CPTII,S$GLB,, | Performed by: NURSE PRACTITIONER

## 2022-05-11 PROCEDURE — 3078F DIAST BP <80 MM HG: CPT | Mod: CPTII,S$GLB,, | Performed by: NURSE PRACTITIONER

## 2022-05-11 PROCEDURE — 81003 POCT URINALYSIS, DIPSTICK, AUTOMATED, W/O SCOPE: ICD-10-PCS | Mod: QW,S$GLB,, | Performed by: NURSE PRACTITIONER

## 2022-05-11 PROCEDURE — 1159F PR MEDICATION LIST DOCUMENTED IN MEDICAL RECORD: ICD-10-PCS | Mod: CPTII,S$GLB,, | Performed by: NURSE PRACTITIONER

## 2022-05-11 PROCEDURE — 87086 URINE CULTURE/COLONY COUNT: CPT | Performed by: NURSE PRACTITIONER

## 2022-05-11 PROCEDURE — 87186 SC STD MICRODIL/AGAR DIL: CPT | Performed by: NURSE PRACTITIONER

## 2022-05-11 PROCEDURE — 3008F PR BODY MASS INDEX (BMI) DOCUMENTED: ICD-10-PCS | Mod: CPTII,S$GLB,, | Performed by: NURSE PRACTITIONER

## 2022-05-11 PROCEDURE — 87077 CULTURE AEROBIC IDENTIFY: CPT | Performed by: NURSE PRACTITIONER

## 2022-05-11 PROCEDURE — 3078F PR MOST RECENT DIASTOLIC BLOOD PRESSURE < 80 MM HG: ICD-10-PCS | Mod: CPTII,S$GLB,, | Performed by: NURSE PRACTITIONER

## 2022-05-11 PROCEDURE — 3074F PR MOST RECENT SYSTOLIC BLOOD PRESSURE < 130 MM HG: ICD-10-PCS | Mod: CPTII,S$GLB,, | Performed by: NURSE PRACTITIONER

## 2022-05-11 PROCEDURE — 99214 PR OFFICE/OUTPT VISIT, EST, LEVL IV, 30-39 MIN: ICD-10-PCS | Mod: S$GLB,,, | Performed by: NURSE PRACTITIONER

## 2022-05-11 PROCEDURE — 1159F MED LIST DOCD IN RCRD: CPT | Mod: CPTII,S$GLB,, | Performed by: NURSE PRACTITIONER

## 2022-05-11 PROCEDURE — 1160F RVW MEDS BY RX/DR IN RCRD: CPT | Mod: CPTII,S$GLB,, | Performed by: NURSE PRACTITIONER

## 2022-05-11 PROCEDURE — 99214 OFFICE O/P EST MOD 30 MIN: CPT | Mod: S$GLB,,, | Performed by: NURSE PRACTITIONER

## 2022-05-11 PROCEDURE — 3008F BODY MASS INDEX DOCD: CPT | Mod: CPTII,S$GLB,, | Performed by: NURSE PRACTITIONER

## 2022-05-11 RX ORDER — PHENTERMINE HYDROCHLORIDE 37.5 MG/1
18.75 TABLET ORAL 2 TIMES DAILY
COMMUNITY
Start: 2022-05-03 | End: 2023-06-28

## 2022-05-11 RX ORDER — ROSUVASTATIN CALCIUM 40 MG/1
40 TABLET, COATED ORAL NIGHTLY
Qty: 90 TABLET | Refills: 1 | Status: SHIPPED | OUTPATIENT
Start: 2022-05-11 | End: 2022-12-06

## 2022-05-11 RX ORDER — PHENAZOPYRIDINE HYDROCHLORIDE 200 MG/1
200 TABLET, FILM COATED ORAL 3 TIMES DAILY PRN
Qty: 9 TABLET | Refills: 0 | Status: SHIPPED | OUTPATIENT
Start: 2022-05-11 | End: 2022-05-21

## 2022-05-11 RX ORDER — NITROFURANTOIN 25; 75 MG/1; MG/1
100 CAPSULE ORAL 2 TIMES DAILY
Qty: 10 CAPSULE | Refills: 0 | Status: SHIPPED | OUTPATIENT
Start: 2022-05-11 | End: 2022-05-16

## 2022-05-11 NOTE — PROGRESS NOTES
SUBJECTIVE:      Patient ID: Lina King is a 57 y.o. female.    Chief Complaint: Hyperlipidemia and Urinary Tract Infection (X 3-4 days, urgency, blood in urine )    Pt presents for F/U HLD and UTI symptoms. She reports dysuria and hematuria x 3-4 days. She reports she hasn't had a UTI in about 8 or 9 years. She has increased her fluid intake and also started AZO with some mild relief reported. Denies flank pain, fevers, frequency, or hesitancy. She has a medical history of HLD. She only takes a baby aspirin daily and Crestor nightly. She has PRN nitroglycerin for CP but hasn't needed it since her hospitalization last year. She continues to be followed by cardiology and last appointment was in March 2022 and recommending yearly follow up after negative workup. She exercises 3 days a week for 30-45 minutes on her treadmill. She plans on increasing the time length. She is not really watching her diet regularly but currently started phentermine to help with weight loss. Denies myalgias with the Crestor. She does have some R hip pain for a few months. Denies injury of the hip. She reports the pain feels like a deep inside ache. She is concerned because it is in the location of where two different basal cell carcinomas were removed by dermatology. Denies any changes to skin to the site and she avoids excessive sunlight. She denies injury and does not endorse any locking or popping sensation. The pain is intermittent. She does not not any inciting factors. She will be due for labs before next visit. She continues to be due for colonoscopy. Denies CP, SOB, wheezing, fevers, nausea, vomiting, diarrhea, constipation, numbness, weakness, dizziness, palpitations, or any other concerns at this time.    Hyperlipidemia  This is a chronic problem. The current episode started more than 1 year ago. The problem is controlled. Recent lipid tests were reviewed and are normal. She has no history of chronic renal disease,  diabetes, hypothyroidism, liver disease, obesity or nephrotic syndrome. There are no known factors aggravating her hyperlipidemia. Pertinent negatives include no chest pain, focal sensory loss, focal weakness, leg pain, myalgias or shortness of breath. Current antihyperlipidemic treatment includes statins, diet change and exercise. The current treatment provides moderate improvement of lipids. There are no compliance problems.  Risk factors for coronary artery disease include dyslipidemia and post-menopausal.   Urinary Tract Infection   This is a new problem. The current episode started in the past 7 days (3-4 days ago). The problem occurs every urination. The problem has been gradually worsening. The quality of the pain is described as burning. There has been no fever. Associated symptoms include hematuria and urgency. Pertinent negatives include no behavior changes, chills, discharge, flank pain, frequency, hesitancy, nausea, possible pregnancy, sweats, vomiting, weight loss, bubble bath use, constipation, rash or withholding. She has tried increased fluids (AZO) for the symptoms. The treatment provided no relief. There is no history of catheterization, diabetes insipidus, diabetes mellitus, genitourinary reflux, hypertension, kidney stones, recurrent UTIs, a single kidney, STD, urinary stasis or a urological procedure.       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 No    Breast-feeding No     Current Outpatient Medications   Medication Sig Dispense Refill    nitroGLYCERIN (NITROSTAT) 0.4 MG SL tablet Place 1 tablet (0.4 mg total) under the tongue every 5 (five) minutes as needed for Chest pain. 90 tablet 0    phentermine (ADIPEX-P) 37.5 mg tablet Take 18.75 mg by mouth 2 (two) times daily.      nitrofurantoin, macrocrystal-monohydrate, (MACROBID) 100 MG capsule Take 1 capsule (100 mg total) by mouth 2 (two) times daily. for 5 days 10 capsule 0    phenazopyridine (PYRIDIUM) 200 MG tablet Take 1 tablet (200 mg total) by mouth 3 (three) times daily as needed for Pain. 9 tablet 0    rosuvastatin (CRESTOR) 40 MG Tab Take 1 tablet (40 mg total) by mouth every evening. 90 tablet 1     No current facility-administered medications for this visit.     Review of patient's allergies indicates:   Allergen Reactions    Pantoprazole Hives    Penicillins Other (See Comments)     As a child      Past Medical History:   Diagnosis Date    Actinic keratosis     AK (actinic keratosis)     Allergic rhinitis     Allergy     Asthma, intermittent     Basal cell cancer ed&c in [place]    chest, right upper lateral thigh    Basal cell carcinoma excised 13    R lateral thigh    BCC (basal cell carcinoma of skin) excised 9/10/13    right lower abdomen    Hyperlipidemia     Keloid cicatrix     Kidney calculi     in early 's    Recurrent UTI      Past Surgical History:   Procedure Laterality Date    anal fissure      APPENDECTOMY      AUGMENTATION OF BREAST      basal cell removal      breast implants       SECTION, LOW TRANSVERSE      INCISION OF BLADDER NECK  "CONTRACTURE         Review of Systems   Constitutional: Negative for activity change, appetite change, chills, fatigue, fever, unexpected weight change and weight loss.   HENT: Negative for congestion, ear pain, postnasal drip, rhinorrhea, sinus pressure, sinus pain, sneezing and sore throat.    Eyes: Negative for pain, discharge and visual disturbance.   Respiratory: Negative for cough, chest tightness, shortness of breath and wheezing.    Cardiovascular: Negative for chest pain, palpitations and leg swelling.   Gastrointestinal: Negative for abdominal distention, abdominal pain, blood in stool, constipation, diarrhea, nausea and vomiting.   Genitourinary: Positive for dysuria, hematuria and urgency. Negative for difficulty urinating, flank pain, frequency, hesitancy, pelvic pain and vaginal discharge.   Musculoskeletal: Positive for arthralgias. Negative for back pain, joint swelling and myalgias.   Skin: Negative for color change, rash and wound.   Neurological: Negative for dizziness, focal weakness, seizures, syncope, weakness, light-headedness, numbness and headaches.   Hematological: Negative for adenopathy.   Psychiatric/Behavioral: Negative for agitation, confusion, dysphoric mood, hallucinations, sleep disturbance and suicidal ideas. The patient is not nervous/anxious.       OBJECTIVE:      Vitals:    05/11/22 0904   BP: 124/78   BP Location: Right arm   Patient Position: Sitting   BP Method: Large (Manual)   Pulse: 83   Resp: 18   Temp: 98.2 °F (36.8 °C)   TempSrc: Oral   SpO2: 99%   Weight: 62.4 kg (137 lb 8 oz)   Height: 5' 3" (1.6 m)     Physical Exam  Vitals reviewed.   Constitutional:       General: She is not in acute distress.     Appearance: Normal appearance. She is well-developed and normal weight. She is not diaphoretic.   HENT:      Head: Normocephalic and atraumatic.      Right Ear: Hearing, tympanic membrane, ear canal and external ear normal.      Left Ear: Hearing, tympanic membrane, ear " canal and external ear normal.      Nose: Nose normal. No mucosal edema, congestion or rhinorrhea.      Mouth/Throat:      Lips: Pink.      Mouth: Mucous membranes are moist.      Pharynx: Oropharynx is clear. Uvula midline. No pharyngeal swelling, oropharyngeal exudate or posterior oropharyngeal erythema.   Eyes:      General: Lids are normal. No scleral icterus.        Right eye: No discharge.         Left eye: No discharge.      Extraocular Movements: Extraocular movements intact.      Conjunctiva/sclera: Conjunctivae normal.      Pupils: Pupils are equal, round, and reactive to light.   Neck:      Thyroid: No thyroid mass or thyromegaly.      Vascular: No carotid bruit.      Trachea: Trachea and phonation normal. No tracheal deviation.   Cardiovascular:      Rate and Rhythm: Normal rate and regular rhythm.      Pulses: Normal pulses.      Heart sounds: Normal heart sounds. No murmur heard.    No friction rub. No gallop.   Pulmonary:      Effort: Pulmonary effort is normal. No respiratory distress.      Breath sounds: Normal breath sounds. No stridor. No decreased breath sounds, wheezing, rhonchi or rales.   Chest:   Breasts:      Right: No supraclavicular adenopathy.      Left: No supraclavicular adenopathy.       Abdominal:      General: Bowel sounds are normal. There is no distension or abdominal bruit.      Palpations: Abdomen is soft. There is no hepatomegaly, splenomegaly or mass.      Tenderness: There is no abdominal tenderness. There is no right CVA tenderness, left CVA tenderness, guarding or rebound. Negative signs include Allison's sign.      Hernia: No hernia is present.   Musculoskeletal:         General: Normal range of motion.      Cervical back: Full passive range of motion without pain, normal range of motion and neck supple.      Lumbar back: Negative right straight leg raise test and negative left straight leg raise test.      Right hip: No deformity, lacerations, tenderness, bony tenderness or  crepitus. Normal range of motion. Normal strength.      Right lower leg: No edema.      Left lower leg: No edema.      Comments: Negative HAILE  Negative FADIR   Lymphadenopathy:      Cervical: No cervical adenopathy.      Upper Body:      Right upper body: No supraclavicular adenopathy.      Left upper body: No supraclavicular adenopathy.   Skin:     General: Skin is warm and dry.      Capillary Refill: Capillary refill takes less than 2 seconds.      Findings: No rash.   Neurological:      General: No focal deficit present.      Mental Status: She is alert and oriented to person, place, and time.      Coordination: Coordination is intact.      Gait: Gait is intact.   Psychiatric:         Attention and Perception: Attention and perception normal.         Mood and Affect: Mood and affect normal.         Speech: Speech normal.         Behavior: Behavior normal. Behavior is cooperative.         Thought Content: Thought content normal. Thought content does not include suicidal plan.         Cognition and Memory: Cognition and memory normal.         Judgment: Judgment normal.        Assessment:       1. UTI symptoms    2. Right hip pain    3. Mixed hyperlipidemia    4. Colon cancer screening    5. Healthcare maintenance        Plan:       UTI symptoms  UA positive for heme and leuks. Macrobid as below. Verifying G&S with culture. Pyridium for pain relief.  -     POCT Urinalysis, Dipstick, Automated, W/O Scope  -     Urine culture  -     nitrofurantoin, macrocrystal-monohydrate, (MACROBID) 100 MG capsule; Take 1 capsule (100 mg total) by mouth 2 (two) times daily. for 5 days  Dispense: 10 capsule; Refill: 0  -     phenazopyridine (PYRIDIUM) 200 MG tablet; Take 1 tablet (200 mg total) by mouth 3 (three) times daily as needed for Pain.  Dispense: 9 tablet; Refill: 0    Right hip pain  Negative for all exam findings. Xray ordered for further evaluation and will call with results.  -     X-Ray Hip 2 or 3 views Right (with  Pelvis when performed); Future; Expected date: 05/11/2022    Mixed hyperlipidemia  Diagnosis is stable on current regimen. Continue current medications. Refills given as previously prescribed.  -     rosuvastatin (CRESTOR) 40 MG Tab; Take 1 tablet (40 mg total) by mouth every evening.  Dispense: 90 tablet; Refill: 1  -     Comprehensive Metabolic Panel; Future; Expected date: 11/01/2022  -     Lipid Panel; Future; Expected date: 11/01/2022    Colon cancer screening  -     Ambulatory referral/consult to Gastroenterology; Future; Expected date: 05/18/2022    Healthcare maintenance  -     CBC Auto Differential; Future; Expected date: 11/01/2022  -     TSH; Future; Expected date: 11/01/2022  -     Urinalysis; Future; Expected date: 11/01/2022        Follow up for Annual Well Check.      5/11/2022 LOS Freitas, FNP

## 2022-05-13 ENCOUNTER — TELEPHONE (OUTPATIENT)
Dept: FAMILY MEDICINE | Facility: CLINIC | Age: 57
End: 2022-05-13

## 2022-05-13 LAB — BACTERIA UR CULT: ABNORMAL

## 2022-05-13 NOTE — TELEPHONE ENCOUNTER
Pt notified    ----- Message from RAMIREZ Iverson sent at 5/13/2022  8:24 AM CDT -----  Please call patient. Urine culture is showing E. Coli infection. This infection is susceptible to the Macrobid she was placed on. If symptoms worsen or persist, follow up in clinic.

## 2022-05-13 NOTE — PROGRESS NOTES
Please call patient. Urine culture is showing E. Coli infection. This infection is susceptible to the Macrobid she was placed on. If symptoms worsen or persist, follow up in clinic.

## 2022-08-01 ENCOUNTER — HOSPITAL ENCOUNTER (OUTPATIENT)
Dept: RADIOLOGY | Facility: HOSPITAL | Age: 57
Discharge: HOME | End: 2022-08-01
Attending: NURSE PRACTITIONER
Payer: COMMERCIAL

## 2022-08-01 ENCOUNTER — OFFICE VISIT (OUTPATIENT)
Dept: FAMILY MEDICINE | Facility: CLINIC | Age: 57
End: 2022-08-01
Payer: COMMERCIAL

## 2022-08-01 VITALS
BODY MASS INDEX: 22.86 KG/M2 | SYSTOLIC BLOOD PRESSURE: 122 MMHG | OXYGEN SATURATION: 98 % | WEIGHT: 129 LBS | HEIGHT: 63 IN | DIASTOLIC BLOOD PRESSURE: 74 MMHG | TEMPERATURE: 98 F | HEART RATE: 101 BPM | RESPIRATION RATE: 18 BRPM

## 2022-08-01 DIAGNOSIS — M25.562 PAIN IN LATERAL PORTION OF LEFT KNEE: ICD-10-CM

## 2022-08-01 DIAGNOSIS — M25.562 PAIN IN LATERAL PORTION OF LEFT KNEE: Primary | ICD-10-CM

## 2022-08-01 PROCEDURE — 1159F PR MEDICATION LIST DOCUMENTED IN MEDICAL RECORD: ICD-10-PCS | Mod: CPTII,S$GLB,, | Performed by: NURSE PRACTITIONER

## 2022-08-01 PROCEDURE — 3008F PR BODY MASS INDEX (BMI) DOCUMENTED: ICD-10-PCS | Mod: CPTII,S$GLB,, | Performed by: NURSE PRACTITIONER

## 2022-08-01 PROCEDURE — 1160F PR REVIEW ALL MEDS BY PRESCRIBER/CLIN PHARMACIST DOCUMENTED: ICD-10-PCS | Mod: CPTII,S$GLB,, | Performed by: NURSE PRACTITIONER

## 2022-08-01 PROCEDURE — 3078F DIAST BP <80 MM HG: CPT | Mod: CPTII,S$GLB,, | Performed by: NURSE PRACTITIONER

## 2022-08-01 PROCEDURE — 1160F RVW MEDS BY RX/DR IN RCRD: CPT | Mod: CPTII,S$GLB,, | Performed by: NURSE PRACTITIONER

## 2022-08-01 PROCEDURE — 99213 OFFICE O/P EST LOW 20 MIN: CPT | Mod: S$GLB,,, | Performed by: NURSE PRACTITIONER

## 2022-08-01 PROCEDURE — 3074F SYST BP LT 130 MM HG: CPT | Mod: CPTII,S$GLB,, | Performed by: NURSE PRACTITIONER

## 2022-08-01 PROCEDURE — 99213 PR OFFICE/OUTPT VISIT, EST, LEVL III, 20-29 MIN: ICD-10-PCS | Mod: S$GLB,,, | Performed by: NURSE PRACTITIONER

## 2022-08-01 PROCEDURE — 73564 X-RAY EXAM KNEE 4 OR MORE: CPT | Mod: TC,PO,LT

## 2022-08-01 PROCEDURE — 3078F PR MOST RECENT DIASTOLIC BLOOD PRESSURE < 80 MM HG: ICD-10-PCS | Mod: CPTII,S$GLB,, | Performed by: NURSE PRACTITIONER

## 2022-08-01 PROCEDURE — 3008F BODY MASS INDEX DOCD: CPT | Mod: CPTII,S$GLB,, | Performed by: NURSE PRACTITIONER

## 2022-08-01 PROCEDURE — 3074F PR MOST RECENT SYSTOLIC BLOOD PRESSURE < 130 MM HG: ICD-10-PCS | Mod: CPTII,S$GLB,, | Performed by: NURSE PRACTITIONER

## 2022-08-01 PROCEDURE — 1159F MED LIST DOCD IN RCRD: CPT | Mod: CPTII,S$GLB,, | Performed by: NURSE PRACTITIONER

## 2022-08-01 NOTE — PROGRESS NOTES
SUBJECTIVE:      Patient ID: Lina King is a 57 y.o. female.    Chief Complaint: Knee Pain (L knee pain x 4-5 days, weakness in leg )    Pt presents for L knee pain x 4-5 days. She feels pain and instability in the L knee. She does report a fall a couple months ago, it was raining outside and she hit the sidewalk with her L knee. She reports feeling fullness of the knee and aching and creaking which extends up the L leg from the knee. She has been using NSAIDs and resting at home with moderate relief reported using the NSAIDs. Otherwise doing well. Denies CP, SOB, wheezing, fevers, nausea, vomiting, diarrhea, constipation, numbness, weakness, dizziness, palpitations, or any other concerns at this time.    Knee Pain   The incident occurred 5 to 7 days ago. The injury mechanism was a fall (a couple months ago). The pain is present in the left knee. The quality of the pain is described as aching. The pain is at a severity of 3/10. The pain has been constant since onset. Pertinent negatives include no inability to bear weight, loss of motion, loss of sensation, muscle weakness, numbness or tingling. She reports no foreign bodies present. The symptoms are aggravated by weight bearing. She has tried NSAIDs and rest for the symptoms. The treatment provided moderate relief.       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 No    Breast-feeding No     Current Outpatient Medications   Medication Sig Dispense Refill    phentermine (ADIPEX-P) 37.5 mg tablet Take 18.75 mg by mouth 2 (two) times daily.      rosuvastatin (CRESTOR) 40 MG Tab Take 1 tablet (40 mg total) by mouth every evening. 90 tablet 1    nitroGLYCERIN (NITROSTAT) 0.4 MG SL tablet Place 1 tablet (0.4 mg total) under the tongue every 5 (five) minutes as needed for Chest pain. 90 tablet 0     No current facility-administered medications for this visit.     Review of patient's allergies indicates:   Allergen Reactions    Pantoprazole Hives    Penicillins Other (See Comments)     As a child      Past Medical History:   Diagnosis Date    Actinic keratosis     AK (actinic keratosis)     Allergic rhinitis     Allergy     Asthma, intermittent     Basal cell cancer ed&c in [place]    chest, right upper lateral thigh    Basal cell carcinoma excised 13    R lateral thigh    BCC (basal cell carcinoma of skin) excised 9/10/13    right lower abdomen    Hyperlipidemia     Keloid cicatrix     Kidney calculi     in early 30's    Recurrent UTI      Past Surgical History:   Procedure Laterality Date    anal fissure      APPENDECTOMY      AUGMENTATION OF BREAST      basal cell removal      breast implants       SECTION, LOW TRANSVERSE      INCISION OF BLADDER NECK CONTRACTURE         Review of Systems   Constitutional: Negative for activity change, appetite change, chills, fatigue, fever and unexpected weight change.   HENT: Negative for congestion, ear pain, postnasal drip, rhinorrhea, sinus pressure, sinus pain, sneezing and sore throat.    Eyes: Negative for pain, discharge and visual  "disturbance.   Respiratory: Negative for cough, chest tightness, shortness of breath and wheezing.    Cardiovascular: Negative for chest pain, palpitations and leg swelling.   Gastrointestinal: Negative for abdominal distention, abdominal pain, blood in stool, constipation, diarrhea, nausea and vomiting.   Genitourinary: Negative for difficulty urinating, flank pain, frequency, hematuria, pelvic pain, urgency and vaginal discharge.   Musculoskeletal: Positive for arthralgias and gait problem. Negative for back pain, joint swelling and myalgias.   Skin: Negative for color change, rash and wound.   Neurological: Negative for dizziness, tingling, seizures, syncope, weakness, light-headedness, numbness and headaches.   Hematological: Negative for adenopathy.   Psychiatric/Behavioral: Negative for agitation, confusion, dysphoric mood, hallucinations, sleep disturbance and suicidal ideas. The patient is not nervous/anxious.       OBJECTIVE:      Vitals:    08/01/22 1425   BP: 122/74   BP Location: Left arm   Patient Position: Sitting   BP Method: Large (Manual)   Pulse: 101   Resp: 18   Temp: 98.2 °F (36.8 °C)   TempSrc: Oral   SpO2: 98%   Weight: 58.5 kg (129 lb)   Height: 5' 3" (1.6 m)     Physical Exam  Vitals reviewed.   Constitutional:       General: She is not in acute distress.     Appearance: Normal appearance. She is well-developed and normal weight. She is not diaphoretic.   HENT:      Head: Normocephalic and atraumatic.      Right Ear: Hearing and external ear normal.      Left Ear: Hearing and external ear normal.      Nose: Nose normal.      Mouth/Throat:      Lips: Pink.      Mouth: Mucous membranes are moist.   Eyes:      General: Lids are normal. No scleral icterus.        Right eye: No discharge.         Left eye: No discharge.      Extraocular Movements: Extraocular movements intact.      Conjunctiva/sclera: Conjunctivae normal.      Pupils: Pupils are equal, round, and reactive to light.   Neck:      " Thyroid: No thyroid mass or thyromegaly.      Vascular: No carotid bruit.      Trachea: Trachea and phonation normal. No tracheal deviation.   Cardiovascular:      Rate and Rhythm: Normal rate and regular rhythm.      Pulses: Normal pulses.      Heart sounds: Normal heart sounds. No murmur heard.    No friction rub. No gallop.   Pulmonary:      Effort: Pulmonary effort is normal. No respiratory distress.      Breath sounds: Normal breath sounds. No stridor. No decreased breath sounds, wheezing, rhonchi or rales.   Chest:   Breasts:      Right: No supraclavicular adenopathy.      Left: No supraclavicular adenopathy.       Abdominal:      General: Bowel sounds are normal.      Palpations: Abdomen is soft.      Tenderness: There is no abdominal tenderness.   Musculoskeletal:         General: Normal range of motion.      Cervical back: Full passive range of motion without pain, normal range of motion and neck supple.      Left knee: Effusion and crepitus present. No swelling, deformity, erythema, ecchymosis, lacerations or bony tenderness. Normal range of motion. Tenderness present over the lateral joint line and ACL. Normal alignment, normal meniscus and normal patellar mobility. Normal pulse.      Right lower leg: No edema.      Left lower leg: No edema.   Lymphadenopathy:      Cervical: No cervical adenopathy.      Upper Body:      Right upper body: No supraclavicular adenopathy.      Left upper body: No supraclavicular adenopathy.   Skin:     General: Skin is warm and dry.      Capillary Refill: Capillary refill takes less than 2 seconds.      Findings: No rash.   Neurological:      General: No focal deficit present.      Mental Status: She is alert and oriented to person, place, and time.      Coordination: Coordination is intact.      Gait: Gait is intact.   Psychiatric:         Attention and Perception: Attention and perception normal.         Mood and Affect: Mood and affect normal.         Speech: Speech  normal.         Behavior: Behavior normal. Behavior is cooperative.         Thought Content: Thought content normal. Thought content does not include suicidal plan.         Cognition and Memory: Cognition and memory normal.         Judgment: Judgment normal.        Assessment:       1. Pain in lateral portion of left knee        Plan:       Pain in lateral portion of left knee  Instructed on RICE regimen for the knee. Will get Xray to R/O bony abnormality. Will likely need CT or MRI for evaluation of the menisci and ligaments in relation to the instability of the knee.  -     X-Ray Knee Complete 4 or More Views Left; Future; Expected date: 08/01/2022        Follow up if symptoms worsen or fail to improve.      8/1/2022 LOS Freitas, FNP

## 2022-08-10 ENCOUNTER — TELEPHONE (OUTPATIENT)
Dept: FAMILY MEDICINE | Facility: CLINIC | Age: 57
End: 2022-08-10

## 2022-08-10 DIAGNOSIS — M25.562 ACUTE PAIN OF LEFT KNEE: Primary | ICD-10-CM

## 2022-08-10 NOTE — TELEPHONE ENCOUNTER
Patient stated that her knee is killing her. She has purchased a knee brace to wear and it helped some yesterday. She would like the referral to orthopedic. I have pended the referral for review and sign.

## 2022-08-10 NOTE — TELEPHONE ENCOUNTER
Sanam Niño, RAMIREZ Marion Staff  Please call pt. Further imaging of the knee was denied by insurance at this time. How is her knee doing? If still feeling unstable, would recommend orthopedic referral for further evaluation.

## 2022-11-09 ENCOUNTER — TELEPHONE (OUTPATIENT)
Dept: FAMILY MEDICINE | Facility: CLINIC | Age: 57
End: 2022-11-09

## 2022-12-12 ENCOUNTER — OFFICE VISIT (OUTPATIENT)
Dept: FAMILY MEDICINE | Facility: CLINIC | Age: 57
End: 2022-12-12
Payer: COMMERCIAL

## 2022-12-12 VITALS
HEART RATE: 88 BPM | RESPIRATION RATE: 18 BRPM | DIASTOLIC BLOOD PRESSURE: 76 MMHG | WEIGHT: 120 LBS | OXYGEN SATURATION: 98 % | BODY MASS INDEX: 21.26 KG/M2 | SYSTOLIC BLOOD PRESSURE: 124 MMHG | HEIGHT: 63 IN

## 2022-12-12 DIAGNOSIS — E78.2 MIXED HYPERLIPIDEMIA: ICD-10-CM

## 2022-12-12 DIAGNOSIS — Z00.00 ANNUAL VISIT FOR GENERAL ADULT MEDICAL EXAMINATION WITHOUT ABNORMAL FINDINGS: Primary | ICD-10-CM

## 2022-12-12 DIAGNOSIS — Z12.31 BREAST CANCER SCREENING BY MAMMOGRAM: ICD-10-CM

## 2022-12-12 DIAGNOSIS — Z01.00 EYE EXAM, ROUTINE: ICD-10-CM

## 2022-12-12 PROCEDURE — 1159F PR MEDICATION LIST DOCUMENTED IN MEDICAL RECORD: ICD-10-PCS | Mod: CPTII,S$GLB,, | Performed by: NURSE PRACTITIONER

## 2022-12-12 PROCEDURE — 3074F SYST BP LT 130 MM HG: CPT | Mod: CPTII,S$GLB,, | Performed by: NURSE PRACTITIONER

## 2022-12-12 PROCEDURE — 3008F PR BODY MASS INDEX (BMI) DOCUMENTED: ICD-10-PCS | Mod: CPTII,S$GLB,, | Performed by: NURSE PRACTITIONER

## 2022-12-12 PROCEDURE — 1160F RVW MEDS BY RX/DR IN RCRD: CPT | Mod: CPTII,S$GLB,, | Performed by: NURSE PRACTITIONER

## 2022-12-12 PROCEDURE — 3008F BODY MASS INDEX DOCD: CPT | Mod: CPTII,S$GLB,, | Performed by: NURSE PRACTITIONER

## 2022-12-12 PROCEDURE — 3078F PR MOST RECENT DIASTOLIC BLOOD PRESSURE < 80 MM HG: ICD-10-PCS | Mod: CPTII,S$GLB,, | Performed by: NURSE PRACTITIONER

## 2022-12-12 PROCEDURE — 3074F PR MOST RECENT SYSTOLIC BLOOD PRESSURE < 130 MM HG: ICD-10-PCS | Mod: CPTII,S$GLB,, | Performed by: NURSE PRACTITIONER

## 2022-12-12 PROCEDURE — 99396 PREV VISIT EST AGE 40-64: CPT | Mod: S$GLB,,, | Performed by: NURSE PRACTITIONER

## 2022-12-12 PROCEDURE — 1160F PR REVIEW ALL MEDS BY PRESCRIBER/CLIN PHARMACIST DOCUMENTED: ICD-10-PCS | Mod: CPTII,S$GLB,, | Performed by: NURSE PRACTITIONER

## 2022-12-12 PROCEDURE — 3078F DIAST BP <80 MM HG: CPT | Mod: CPTII,S$GLB,, | Performed by: NURSE PRACTITIONER

## 2022-12-12 PROCEDURE — 99396 PR PREVENTIVE VISIT,EST,40-64: ICD-10-PCS | Mod: S$GLB,,, | Performed by: NURSE PRACTITIONER

## 2022-12-12 PROCEDURE — 1159F MED LIST DOCD IN RCRD: CPT | Mod: CPTII,S$GLB,, | Performed by: NURSE PRACTITIONER

## 2022-12-12 RX ORDER — ROSUVASTATIN CALCIUM 40 MG/1
40 TABLET, COATED ORAL NIGHTLY
Qty: 90 TABLET | Refills: 0 | Status: SHIPPED | OUTPATIENT
Start: 2022-12-12 | End: 2023-06-28

## 2022-12-12 NOTE — PROGRESS NOTES
SUBJECTIVE:      Patient ID: Lina King is a 57 y.o. female.    Chief Complaint: Annual Exam    Pt presents for annual exam. She has a medical history of HLD. She only takes a baby aspirin daily and Crestor nightly. She exercises intermittently when she can. Unfortunately, she has been busy at work and has been unable to exercise as she would. She also recently used an ab tool which she has been having soreness from. She has been watching her diet more recently since on phentermine. She has been watching her portion size and avoids fast foods and road food. She goes every 6 months for cleanings at the dentist and just got a bridge. She does not go to the eye doctor, reporting she got Lasik years back but hasn't been back since. She wears readers from [employer]. She would like a referral to get an eye exam completed. She is due for labs and her mammogram. She is deferring vaccinations today. Denies CP, SOB, wheezing, fevers, nausea, vomiting, diarrhea, constipation, numbness, weakness, dizziness, palpitations, or any other concerns at this time.    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 No     Breast-feeding No     Current Outpatient Medications   Medication Sig Dispense Refill    phentermine (ADIPEX-P) 37.5 mg tablet Take 18.75 mg by mouth 2 (two) times daily.      rosuvastatin (CRESTOR) 40 MG Tab Take 1 tablet (40 mg total) by mouth every evening. 90 tablet 0     No current facility-administered medications for this visit.     Review of patient's allergies indicates:   Allergen Reactions    Pantoprazole Hives    Penicillins Other (See Comments)     As a child      Past Medical History:   Diagnosis Date    Actinic keratosis     AK (actinic keratosis)     Allergic rhinitis     Allergy     Asthma, intermittent     Basal cell cancer ed&c in [place]    chest, right upper lateral thigh    Basal cell carcinoma excised 13    R lateral thigh    BCC (basal cell carcinoma of skin) excised 9/10/13    right lower abdomen    Hyperlipidemia     Keloid cicatrix     Kidney calculi     in early     Recurrent UTI      Past Surgical History:   Procedure Laterality Date    anal fissure      APPENDECTOMY      AUGMENTATION OF BREAST      basal cell removal      breast implants       SECTION, LOW TRANSVERSE      INCISION OF BLADDER NECK CONTRACTURE         Review of Systems   Constitutional:  Negative for activity change, appetite change, chills, fatigue, fever and unexpected weight change.   HENT:  Negative for congestion, ear pain, postnasal drip, rhinorrhea, sinus pressure, sinus pain, sneezing and sore throat.    Eyes:  Negative for pain, discharge and visual disturbance.   Respiratory:  Negative for cough, chest tightness, shortness of breath and wheezing.    Cardiovascular:  Negative for chest pain, palpitations and leg swelling.   Gastrointestinal:  Negative for abdominal distention, abdominal pain, blood in stool, constipation, diarrhea, nausea and vomiting.   Genitourinary:  Negative for difficulty urinating, flank pain, frequency, hematuria, pelvic pain, urgency and vaginal discharge.  "  Musculoskeletal:  Negative for back pain, joint swelling and myalgias.   Skin:  Negative for color change, rash and wound.   Neurological:  Negative for dizziness, seizures, syncope, weakness, light-headedness, numbness and headaches.   Hematological:  Negative for adenopathy.   Psychiatric/Behavioral:  Negative for agitation, confusion, dysphoric mood, hallucinations, sleep disturbance and suicidal ideas. The patient is not nervous/anxious.     OBJECTIVE:      Vitals:    12/12/22 1351   BP: 124/76   BP Location: Left arm   Patient Position: Sitting   BP Method: Medium (Manual)   Pulse: 88   Resp: 18   SpO2: 98%   Weight: 54.4 kg (120 lb)   Height: 5' 3" (1.6 m)     Physical Exam  Vitals reviewed.   Constitutional:       General: She is not in acute distress.     Appearance: Normal appearance. She is well-developed and normal weight. She is not diaphoretic.   HENT:      Head: Normocephalic and atraumatic.      Right Ear: Hearing, tympanic membrane, ear canal and external ear normal.      Left Ear: Hearing, tympanic membrane, ear canal and external ear normal.      Nose: Nose normal. No mucosal edema, congestion or rhinorrhea.      Mouth/Throat:      Lips: Pink.      Mouth: Mucous membranes are moist.      Pharynx: Oropharynx is clear. Uvula midline. No pharyngeal swelling, oropharyngeal exudate or posterior oropharyngeal erythema.   Eyes:      General: Lids are normal. No scleral icterus.        Right eye: No discharge.         Left eye: No discharge.      Extraocular Movements: Extraocular movements intact.      Conjunctiva/sclera: Conjunctivae normal.      Pupils: Pupils are equal, round, and reactive to light.   Neck:      Thyroid: No thyroid mass or thyromegaly.      Vascular: No carotid bruit.      Trachea: Trachea and phonation normal. No tracheal deviation.   Cardiovascular:      Rate and Rhythm: Normal rate and regular rhythm.      Pulses: Normal pulses.      Heart sounds: Normal heart sounds. No murmur " heard.    No friction rub. No gallop.   Pulmonary:      Effort: Pulmonary effort is normal. No respiratory distress.      Breath sounds: Normal breath sounds. No stridor. No decreased breath sounds, wheezing, rhonchi or rales.   Abdominal:      General: Bowel sounds are normal. There is no distension or abdominal bruit.      Palpations: Abdomen is soft. There is no hepatomegaly, splenomegaly or mass.      Tenderness: There is no abdominal tenderness. There is no guarding or rebound. Negative signs include Allison's sign.      Hernia: No hernia is present.   Musculoskeletal:         General: Normal range of motion.      Cervical back: Full passive range of motion without pain, normal range of motion and neck supple.      Right lower leg: No edema.      Left lower leg: No edema.   Lymphadenopathy:      Cervical: No cervical adenopathy.      Upper Body:      Right upper body: No supraclavicular adenopathy.      Left upper body: No supraclavicular adenopathy.   Skin:     General: Skin is warm and dry.      Capillary Refill: Capillary refill takes less than 2 seconds.      Findings: No rash.   Neurological:      General: No focal deficit present.      Mental Status: She is alert and oriented to person, place, and time.      GCS: GCS eye subscore is 4. GCS verbal subscore is 5. GCS motor subscore is 6.      Coordination: Coordination is intact.      Gait: Gait is intact.   Psychiatric:         Attention and Perception: Attention and perception normal.         Mood and Affect: Mood and affect normal.         Speech: Speech normal.         Behavior: Behavior normal. Behavior is cooperative.         Thought Content: Thought content normal. Thought content does not include suicidal plan.         Cognition and Memory: Cognition and memory normal.         Judgment: Judgment normal.      Assessment:       1. Annual visit for general adult medical examination without abnormal findings    2. Mixed hyperlipidemia    3. Eye exam,  routine    4. Breast cancer screening by mammogram        Plan:       Annual visit for general adult medical examination without abnormal findings  Instructed on guidelines recommending 150 minutes per week of brisk exercise and healthy lifestyle. Recommended well screenings, including immunizations, reviewed with patient. Discussed outstanding health maintenance and rationale for completion. Verbalized understanding.   -     CBC Auto Differential; Future; Expected date: 12/12/2022  -     Comprehensive Metabolic Panel; Future; Expected date: 12/12/2022  -     TSH; Future; Expected date: 12/12/2022  -     Urinalysis; Future; Expected date: 12/12/2022    Mixed hyperlipidemia  Continues on Crestor with no reported side effects. Will recheck levels.  -     Lipid Panel; Future; Expected date: 12/12/2022  -     rosuvastatin (CRESTOR) 40 MG Tab; Take 1 tablet (40 mg total) by mouth every evening.  Dispense: 90 tablet; Refill: 0    Eye exam, routine  -     Ambulatory referral/consult to Ophthalmology; Future; Expected date: 12/19/2022    Breast cancer screening by mammogram  -     Mammo Digital Screening Bilat w/ Basim; Future; Expected date: 12/12/2022        Follow up in about 6 months (around 6/12/2023) for F/U lipids.      12/13/2022 LOS Freitas, SUYAPAP

## 2022-12-13 ENCOUNTER — PATIENT MESSAGE (OUTPATIENT)
Dept: FAMILY MEDICINE | Facility: CLINIC | Age: 57
End: 2022-12-13

## 2023-06-05 ENCOUNTER — PATIENT MESSAGE (OUTPATIENT)
Dept: FAMILY MEDICINE | Facility: CLINIC | Age: 58
End: 2023-06-05

## 2023-06-19 ENCOUNTER — TELEPHONE (OUTPATIENT)
Dept: FAMILY MEDICINE | Facility: CLINIC | Age: 58
End: 2023-06-19

## 2023-06-19 DIAGNOSIS — E78.2 MIXED HYPERLIPIDEMIA: Primary | ICD-10-CM

## 2023-06-19 DIAGNOSIS — Z00.00 ANNUAL VISIT FOR GENERAL ADULT MEDICAL EXAMINATION WITHOUT ABNORMAL FINDINGS: ICD-10-CM

## 2023-06-23 LAB
ALBUMIN SERPL-MCNC: 4.7 G/DL (ref 3.8–4.9)
ALBUMIN/GLOB SERPL: 2.4 {RATIO} (ref 1.2–2.2)
ALP SERPL-CCNC: 99 IU/L (ref 44–121)
ALT SERPL-CCNC: 17 IU/L (ref 0–32)
APPEARANCE UR: CLEAR
AST SERPL-CCNC: 19 IU/L (ref 0–40)
BASOPHILS # BLD AUTO: 0 X10E3/UL (ref 0–0.2)
BASOPHILS NFR BLD AUTO: 0 %
BILIRUB SERPL-MCNC: 0.6 MG/DL (ref 0–1.2)
BILIRUB UR QL STRIP: NEGATIVE
BUN SERPL-MCNC: 11 MG/DL (ref 8–27)
BUN/CREAT SERPL: 15 (ref 12–28)
CALCIUM SERPL-MCNC: 10 MG/DL (ref 8.7–10.3)
CHLORIDE SERPL-SCNC: 101 MMOL/L (ref 96–106)
CHOLEST SERPL-MCNC: 251 MG/DL (ref 100–199)
CO2 SERPL-SCNC: 25 MMOL/L (ref 20–29)
COLOR UR: YELLOW
CREAT SERPL-MCNC: 0.73 MG/DL (ref 0.57–1)
EOSINOPHIL # BLD AUTO: 0.1 X10E3/UL (ref 0–0.4)
EOSINOPHIL NFR BLD AUTO: 2 %
ERYTHROCYTE [DISTWIDTH] IN BLOOD BY AUTOMATED COUNT: 11.5 % (ref 11.7–15.4)
EST. GFR  (NO RACE VARIABLE): 94 ML/MIN/1.73
GLOBULIN SER CALC-MCNC: 2 G/DL (ref 1.5–4.5)
GLUCOSE SERPL-MCNC: 90 MG/DL (ref 70–99)
GLUCOSE UR QL STRIP: NEGATIVE
HCT VFR BLD AUTO: 42.5 % (ref 34–46.6)
HDLC SERPL-MCNC: 85 MG/DL
HGB BLD-MCNC: 14.6 G/DL (ref 11.1–15.9)
HGB UR QL STRIP: NEGATIVE
IMM GRANULOCYTES # BLD AUTO: 0 X10E3/UL (ref 0–0.1)
IMM GRANULOCYTES NFR BLD AUTO: 0 %
KETONES UR QL STRIP: NEGATIVE
LDLC SERPL CALC-MCNC: 153 MG/DL (ref 0–99)
LEUKOCYTE ESTERASE UR QL STRIP: NEGATIVE
LYMPHOCYTES # BLD AUTO: 1.7 X10E3/UL (ref 0.7–3.1)
LYMPHOCYTES NFR BLD AUTO: 24 %
MCH RBC QN AUTO: 32.7 PG (ref 26.6–33)
MCHC RBC AUTO-ENTMCNC: 34.4 G/DL (ref 31.5–35.7)
MCV RBC AUTO: 95 FL (ref 79–97)
MICRO URNS: NORMAL
MONOCYTES # BLD AUTO: 0.4 X10E3/UL (ref 0.1–0.9)
MONOCYTES NFR BLD AUTO: 6 %
NEUTROPHILS # BLD AUTO: 4.9 X10E3/UL (ref 1.4–7)
NEUTROPHILS NFR BLD AUTO: 68 %
NITRITE UR QL STRIP: NEGATIVE
PH UR STRIP: 6 [PH] (ref 5–7.5)
PLATELET # BLD AUTO: 216 X10E3/UL (ref 150–450)
POTASSIUM SERPL-SCNC: 4.1 MMOL/L (ref 3.5–5.2)
PROT SERPL-MCNC: 6.7 G/DL (ref 6–8.5)
PROT UR QL STRIP: NEGATIVE
RBC # BLD AUTO: 4.46 X10E6/UL (ref 3.77–5.28)
SODIUM SERPL-SCNC: 141 MMOL/L (ref 134–144)
SP GR UR STRIP: 1.02 (ref 1–1.03)
TRIGL SERPL-MCNC: 79 MG/DL (ref 0–149)
TSH SERPL DL<=0.005 MIU/L-ACNC: 1.96 UIU/ML (ref 0.45–4.5)
UROBILINOGEN UR STRIP-MCNC: 0.2 MG/DL (ref 0.2–1)
VLDLC SERPL CALC-MCNC: 13 MG/DL (ref 5–40)
WBC # BLD AUTO: 7.1 X10E3/UL (ref 3.4–10.8)

## 2023-06-28 ENCOUNTER — OFFICE VISIT (OUTPATIENT)
Dept: FAMILY MEDICINE | Facility: CLINIC | Age: 58
End: 2023-06-28
Payer: COMMERCIAL

## 2023-06-28 VITALS
OXYGEN SATURATION: 97 % | WEIGHT: 133 LBS | HEIGHT: 63 IN | BODY MASS INDEX: 23.57 KG/M2 | DIASTOLIC BLOOD PRESSURE: 60 MMHG | TEMPERATURE: 99 F | HEART RATE: 97 BPM | SYSTOLIC BLOOD PRESSURE: 122 MMHG

## 2023-06-28 DIAGNOSIS — Z00.00 HEALTHCARE MAINTENANCE: ICD-10-CM

## 2023-06-28 DIAGNOSIS — Z13.0 SCREENING FOR DEFICIENCY ANEMIA: ICD-10-CM

## 2023-06-28 DIAGNOSIS — Z13.1 DIABETES MELLITUS SCREENING: ICD-10-CM

## 2023-06-28 DIAGNOSIS — E78.2 MIXED HYPERLIPIDEMIA: Primary | ICD-10-CM

## 2023-06-28 DIAGNOSIS — Z13.29 THYROID DISORDER SCREEN: ICD-10-CM

## 2023-06-28 PROCEDURE — 3074F SYST BP LT 130 MM HG: CPT | Mod: CPTII,S$GLB,, | Performed by: NURSE PRACTITIONER

## 2023-06-28 PROCEDURE — 3008F PR BODY MASS INDEX (BMI) DOCUMENTED: ICD-10-PCS | Mod: CPTII,S$GLB,, | Performed by: NURSE PRACTITIONER

## 2023-06-28 PROCEDURE — 1159F MED LIST DOCD IN RCRD: CPT | Mod: CPTII,S$GLB,, | Performed by: NURSE PRACTITIONER

## 2023-06-28 PROCEDURE — 1160F PR REVIEW ALL MEDS BY PRESCRIBER/CLIN PHARMACIST DOCUMENTED: ICD-10-PCS | Mod: CPTII,S$GLB,, | Performed by: NURSE PRACTITIONER

## 2023-06-28 PROCEDURE — 1160F RVW MEDS BY RX/DR IN RCRD: CPT | Mod: CPTII,S$GLB,, | Performed by: NURSE PRACTITIONER

## 2023-06-28 PROCEDURE — 3078F DIAST BP <80 MM HG: CPT | Mod: CPTII,S$GLB,, | Performed by: NURSE PRACTITIONER

## 2023-06-28 PROCEDURE — 99213 OFFICE O/P EST LOW 20 MIN: CPT | Mod: S$GLB,,, | Performed by: NURSE PRACTITIONER

## 2023-06-28 PROCEDURE — 99213 PR OFFICE/OUTPT VISIT, EST, LEVL III, 20-29 MIN: ICD-10-PCS | Mod: S$GLB,,, | Performed by: NURSE PRACTITIONER

## 2023-06-28 PROCEDURE — 3074F PR MOST RECENT SYSTOLIC BLOOD PRESSURE < 130 MM HG: ICD-10-PCS | Mod: CPTII,S$GLB,, | Performed by: NURSE PRACTITIONER

## 2023-06-28 PROCEDURE — 1159F PR MEDICATION LIST DOCUMENTED IN MEDICAL RECORD: ICD-10-PCS | Mod: CPTII,S$GLB,, | Performed by: NURSE PRACTITIONER

## 2023-06-28 PROCEDURE — 3078F PR MOST RECENT DIASTOLIC BLOOD PRESSURE < 80 MM HG: ICD-10-PCS | Mod: CPTII,S$GLB,, | Performed by: NURSE PRACTITIONER

## 2023-06-28 PROCEDURE — 3008F BODY MASS INDEX DOCD: CPT | Mod: CPTII,S$GLB,, | Performed by: NURSE PRACTITIONER

## 2023-06-28 NOTE — PROGRESS NOTES
Subjective:       Patient ID: Lina King is a 58 y.o. female.    Chief Complaint: Hyperlipidemia and Follow-up    Patient presents today as a new patient to me and previously established patient of RAMIREZ Freeman.  She was previously taking Crestor 40 mg for her cholesterol.  Since she started having muscle issues, she stopped taking the medication.  Labs have been completed and will be reviewed today.  Cholesterol is elevated.  ASCVD risk score of 2.7% over the next 10 year.  Patient has no history of diabetes.  She is a normal weight for height with a BMI 23.56    Hyperlipidemia  This is a chronic problem. The current episode started more than 1 month ago. The problem is uncontrolled. Recent lipid tests were reviewed and are high. She has no history of chronic renal disease, diabetes, hypothyroidism or liver disease. Factors aggravating her hyperlipidemia include fatty foods. Associated symptoms include myalgias. Pertinent negatives include no chest pain, leg pain or shortness of breath. Current antihyperlipidemic treatment includes diet change and exercise. The current treatment provides mild improvement of lipids. Compliance problems include adherence to exercise and adherence to diet.  Risk factors for coronary artery disease include dyslipidemia.   Follow-up  This is a chronic problem. The current episode started more than 1 month ago. The problem occurs daily. The problem has been waxing and waning. Associated symptoms include myalgias. Pertinent negatives include no abdominal pain, arthralgias, chest pain, congestion, coughing, fever, headaches, joint swelling, nausea, neck pain, rash, sore throat, vomiting or weakness. The symptoms are aggravated by stress and exertion. She has tried rest, position changes and relaxation for the symptoms. The treatment provided moderate relief.   Review of Systems   Constitutional:  Negative for activity change, appetite change, fever and unexpected weight  change.   HENT:  Negative for congestion, ear discharge, ear pain, hearing loss, rhinorrhea, sore throat, trouble swallowing and voice change.    Eyes:  Negative for photophobia, pain, discharge and visual disturbance.   Respiratory:  Negative for cough, chest tightness, shortness of breath and wheezing.    Cardiovascular:  Negative for chest pain and palpitations.        Hyperlipidemia   Gastrointestinal:  Negative for abdominal pain, blood in stool, constipation, diarrhea, nausea and vomiting.   Endocrine: Negative for cold intolerance, heat intolerance, polydipsia and polyuria.   Genitourinary:  Negative for difficulty urinating, dysuria, hematuria and menstrual problem.   Musculoskeletal:  Positive for myalgias. Negative for arthralgias, gait problem, joint swelling and neck pain.   Skin:  Negative for rash.   Allergic/Immunologic: Negative for immunocompromised state.   Neurological:  Negative for speech difficulty, weakness and headaches.   Psychiatric/Behavioral:  Negative for confusion, dysphoric mood, self-injury and suicidal ideas.      Past Medical History:   Diagnosis Date    Actinic keratosis     AK (actinic keratosis)     Allergic rhinitis     Allergy     Asthma, intermittent     Basal cell cancer ed&c in [place]    chest, right upper lateral thigh    Basal cell carcinoma excised 13    R lateral thigh    BCC (basal cell carcinoma of skin) excised 9/10/13    right lower abdomen    Hyperlipidemia     Keloid cicatrix     Kidney calculi     in early 30's    Recurrent UTI       Past Surgical History:   Procedure Laterality Date    anal fissure      APPENDECTOMY      AUGMENTATION OF BREAST      basal cell removal      breast implants       SECTION, LOW TRANSVERSE      INCISION OF BLADDER NECK CONTRACTURE         Family History   Problem Relation Age of Onset    ALS Mother     Cancer Father         brain    Alcohol abuse Father     Thyroid disease Sister     Melanoma Sister     Breast cancer  "Paternal Aunt        Social History     Socioeconomic History    Marital status:    Occupational History    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 No    Breast-feeding No       No current outpatient medications on file.     No current facility-administered medications for this visit.       Review of patient's allergies indicates:   Allergen Reactions    Crestor [rosuvastatin]     Pantoprazole Hives    Penicillins Other (See Comments)     As a child     Objective:      Blood pressure 122/60, pulse 97, temperature 98.8 °F (37.1 °C), height 5' 3" (1.6 m), weight 60.3 kg (133 lb), last menstrual period 06/05/2013, SpO2 97 %. Body mass index is 23.56 kg/m².   Physical Exam  Vitals and nursing note reviewed.   Constitutional:       General: She is not in acute distress.     Appearance: Normal appearance. She is well-developed. She is not ill-appearing.   HENT:      Head: Normocephalic and atraumatic.      Right Ear: External ear normal.      Left Ear: External ear normal.      Nose: Nose normal.      Mouth/Throat:      Mouth: Mucous membranes are moist.   Eyes:      General: Lids are normal. Lids are everted, no foreign bodies appreciated.      Conjunctiva/sclera: Conjunctivae normal.      Pupils: Pupils are equal, round, and reactive to light.      Right eye: Pupil is round and reactive.      Left eye: Pupil is round and reactive.   Neck:      Trachea: Trachea normal.   Cardiovascular:      Rate and Rhythm: Normal rate and regular rhythm.      Pulses: Normal pulses.      Heart sounds: Normal heart sounds, S1 normal and S2 normal.   Pulmonary:      Effort: Pulmonary effort is normal. No respiratory distress.      Breath sounds: " Normal breath sounds.   Abdominal:      General: Abdomen is flat. Bowel sounds are normal.      Palpations: Abdomen is soft. Abdomen is not rigid.      Tenderness: There is no guarding.   Musculoskeletal:         General: Normal range of motion.      Cervical back: Normal range of motion and neck supple. No muscular tenderness.   Lymphadenopathy:      Cervical: No cervical adenopathy.   Skin:     General: Skin is warm and dry.      Capillary Refill: Capillary refill takes less than 2 seconds.   Neurological:      General: No focal deficit present.      Mental Status: She is alert and oriented to person, place, and time.   Psychiatric:         Mood and Affect: Mood normal.         Behavior: Behavior normal. Behavior is cooperative.         Thought Content: Thought content normal.         Judgment: Judgment normal.           Assessment:       1. Mixed hyperlipidemia    2. Diabetes mellitus screening    3. Thyroid disorder screen    4. Screening for deficiency anemia    5. Healthcare maintenance        Plan:       Lina was seen today for hyperlipidemia and follow-up.    Diagnoses and all orders for this visit:    Mixed hyperlipidemia  -     Lipid Panel; Future  -     Lipid Panel    Diabetes mellitus screening  -     Comprehensive Metabolic Panel; Future  -     Comprehensive Metabolic Panel    Thyroid disorder screen  -     TSH; Future  -     TSH    Screening for deficiency anemia  -     CBC Auto Differential; Future  -     CBC Auto Differential    Healthcare maintenance  -     Urinalysis; Future  -     Urinalysis         Follow up in 6 months with labs prior to office visit for chronic condition management.

## 2024-03-28 ENCOUNTER — PATIENT MESSAGE (OUTPATIENT)
Dept: ADMINISTRATIVE | Facility: HOSPITAL | Age: 59
End: 2024-03-28
Payer: COMMERCIAL

## 2024-04-19 ENCOUNTER — PATIENT MESSAGE (OUTPATIENT)
Dept: ADMINISTRATIVE | Facility: HOSPITAL | Age: 59
End: 2024-04-19
Payer: COMMERCIAL

## 2024-05-28 ENCOUNTER — PATIENT OUTREACH (OUTPATIENT)
Dept: ADMINISTRATIVE | Facility: HOSPITAL | Age: 59
End: 2024-05-28
Payer: COMMERCIAL

## 2024-05-28 DIAGNOSIS — Z12.31 ENCOUNTER FOR SCREENING MAMMOGRAM FOR BREAST CANCER: Primary | ICD-10-CM

## 2024-07-09 ENCOUNTER — PATIENT MESSAGE (OUTPATIENT)
Dept: ADMINISTRATIVE | Facility: HOSPITAL | Age: 59
End: 2024-07-09
Payer: COMMERCIAL

## 2024-08-12 ENCOUNTER — PATIENT MESSAGE (OUTPATIENT)
Dept: ADMINISTRATIVE | Facility: HOSPITAL | Age: 59
End: 2024-08-12
Payer: COMMERCIAL

## 2024-10-16 ENCOUNTER — PATIENT MESSAGE (OUTPATIENT)
Dept: FAMILY MEDICINE | Facility: CLINIC | Age: 59
End: 2024-10-16
Payer: COMMERCIAL

## 2025-06-24 ENCOUNTER — TELEPHONE (OUTPATIENT)
Dept: DERMATOLOGY | Facility: CLINIC | Age: 60
End: 2025-06-24
Payer: COMMERCIAL

## 2025-06-24 NOTE — TELEPHONE ENCOUNTER
Copied from CRM #[id number]. Topic: Appointments - Appointment Access  >> Jun 24, 2025  4:40 PM Kirstin wrote:  Type:  Sooner Appointment Request    Caller is requesting a sooner appointment.  Caller declined first available appointment listed below.  Caller will not accept being placed on the waitlist and is requesting a message be sent to doctor.  Name of Caller: Patient   When is the first available appointment?  Symptoms: Skin issue on hand   Would the patient rather a call back or a response via [facility]? Call   Best Call Back Number: [phone]  Additional Information: Please call back to advise. Thanks!

## 2025-07-21 ENCOUNTER — OFFICE VISIT (OUTPATIENT)
Dept: DERMATOLOGY | Facility: CLINIC | Age: 60
End: 2025-07-21
Payer: COMMERCIAL

## 2025-07-21 VITALS — HEIGHT: 63 IN | WEIGHT: 117 LBS | BODY MASS INDEX: 20.73 KG/M2

## 2025-07-21 DIAGNOSIS — L20.9 ATOPIC DERMATITIS OF BOTH HANDS: Primary | ICD-10-CM

## 2025-07-21 PROCEDURE — 99204 OFFICE O/P NEW MOD 45 MIN: CPT | Mod: S$GLB,,, | Performed by: STUDENT IN AN ORGANIZED HEALTH CARE EDUCATION/TRAINING PROGRAM

## 2025-07-21 PROCEDURE — 3008F BODY MASS INDEX DOCD: CPT | Mod: CPTII,S$GLB,, | Performed by: STUDENT IN AN ORGANIZED HEALTH CARE EDUCATION/TRAINING PROGRAM

## 2025-07-21 PROCEDURE — 1159F MED LIST DOCD IN RCRD: CPT | Mod: CPTII,S$GLB,, | Performed by: STUDENT IN AN ORGANIZED HEALTH CARE EDUCATION/TRAINING PROGRAM

## 2025-07-21 PROCEDURE — 1160F RVW MEDS BY RX/DR IN RCRD: CPT | Mod: CPTII,S$GLB,, | Performed by: STUDENT IN AN ORGANIZED HEALTH CARE EDUCATION/TRAINING PROGRAM

## 2025-07-21 RX ORDER — DUPILUMAB 300 MG/2ML
300 INJECTION, SOLUTION SUBCUTANEOUS
Qty: 2 ML | Refills: 3 | Status: SHIPPED | OUTPATIENT
Start: 2025-07-21 | End: 2025-07-23 | Stop reason: SDUPTHER

## 2025-07-21 RX ORDER — DUPILUMAB 300 MG/2ML
INJECTION, SOLUTION SUBCUTANEOUS
Qty: 6 ML | Refills: 0 | Status: ACTIVE | OUTPATIENT
Start: 2025-07-21

## 2025-07-21 RX ORDER — BETAMETHASONE DIPROPIONATE 0.5 MG/G
OINTMENT, AUGMENTED TOPICAL NIGHTLY
Qty: 50 G | Refills: 0 | Status: SHIPPED | OUTPATIENT
Start: 2025-07-21

## 2025-07-21 NOTE — PROGRESS NOTES
Subjective:      Patient ID:  Lina King is a 60 y.o. female who presents for   Chief Complaint   Patient presents with    Rash     hands     New patient    States she has had an undergoing rash x ~2 years.   Tingles and burns.   Cooks occasionally, denies having hands in water.   Cleans with gloves.   Tried tea tree oil  No eye issues  No hx of cold sores.   Peels skin on hands.    Has had sensitive skin since childhood.       Previous treatments:  Prednisone taper  Clobetasol cream     Had bx done that was inconclusive-   [facility]- 10/24     LEFT PALM, PUNCH:     - ACRAL SKIN SHOWING HYPERKERATOSIS WITH SCATTERED DERMAL LYMPHOCYTES.     - SEE NOTE.     Note:  The inflammatory changes are mild and nonspecific.  Please   correlate clinically.  If there has been topical therapy, this may have   altered the appearance.  No fungal organisms were identified on PAS   stained       11/5 note from Dr Hilton  Called and spoke to patient and gave her biopsy results. Told her per Dr. Cee the next option would be Dupixent if no improvement. Patient said she is taking precautions with chemicals and diligently using gloves. She said she is also using clobetasol cream. She says with all of that , her hands have significantly improved. She says she may be interested in dupixent if this is a continuous problem,  she will call if it becomes severe again. Patient had no further questions. VU + thanked me for calling.    Pt here today for a rash on her hands x 2 years- states that it is spreading, she cuts off the overgrowth of skin and it comes back feeling very tight and green in color  Is no longer using the clobetasol     Derm HX:  Hx of NMSC (R forehead basosquamous ca s/p Mohs) and AK. Completed a course of 5FU + vit D to the forehead since last visit - found that it helped a lot with the texture of her skin. Would like to start the rest of her face soon.   Has fhx of MM- sister         Review of Systems    Constitutional:  Negative for fever, chills, fatigue and malaise.   Skin:  Positive for rash and dry skin. Negative for itching, activity-related sunscreen use and sensitivity to antibiotic ointment.       Objective:   Physical Exam   Constitutional: She appears well-developed and well-nourished. No distress.   Neurological: She is alert and oriented to person, place, and time. She is not disoriented.   Psychiatric: She has a normal mood and affect.   Skin:   Areas Examined (abnormalities noted in diagram):   RUE Inspected  LUE Inspection Performed            Diagram Legend     Erythematous scaling macule/papule c/w actinic keratosis       Vascular papule c/w angioma      Pigmented verrucoid papule/plaque c/w seborrheic keratosis      Yellow umbilicated papule c/w sebaceous hyperplasia      Irregularly shaped tan macule c/w lentigo     1-2 mm smooth white papules consistent with Milia      Movable subcutaneous cyst with punctum c/w epidermal inclusion cyst      Subcutaneous movable cyst c/w pilar cyst      Firm pink to brown papule c/w dermatofibroma      Pedunculated fleshy papule(s) c/w skin tag(s)      Evenly pigmented macule c/w junctional nevus     Mildly variegated pigmented, slightly irregular-bordered macule c/w mildly atypical nevus      Flesh colored to evenly pigmented papule c/w intradermal nevus       Pink pearly papule/plaque c/w basal cell carcinoma      Erythematous hyperkeratotic cursted plaque c/w SCC      Surgical scar with no sign of skin cancer recurrence      Open and closed comedones      Inflammatory papules and pustules      Verrucoid papule consistent consistent with wart     Erythematous eczematous patches and plaques     Dystrophic onycholytic nail with subungual debris c/w onychomycosis     Umbilicated papule    Erythematous-base heme-crusted tan verrucoid plaque consistent with inflamed seborrheic keratosis     Erythematous Silvery Scaling Plaque c/w Psoriasis     See  annotation            Assessment / Plan:        Atopic dermatitis of both hands  -     augmented betamethasone dipropionate (DIPROLENE-AF) 0.05 % ointment; Apply topically every evening.  Dispense: 50 g; Refill: 0  -     dupilumab (DUPIXENT PEN) 300 mg/2 mL PnIj; Inject 2 mLs (300 mg total) into the skin every 14 (fourteen) days.  Dispense: 2 mL; Refill: 3  -     dupilumab (DUPIXENT PEN) 300 mg/2 mL PnIj; Inject 600mg SQ on day 0 then 300mg SC q 2 wk  Dispense: 6 mL; Refill: 0  Biopsy performed at [facility] reviewed  Minimal improvement with clobetasol cream, prednisone taper  Trial of dupixent   Avoid picking/manipulating skin  Gloves with chemicals and wet work  Discussed  benefits and risks of Dupixent including but not limited to injection site reactions, Dupixent- induced facial dermatitis, increased risk of eye/eyelid irritation and inflammation as well as oral herpes infection and possible helminth infections.      Dosing:  FDA approved for treatment of adult atopic dermatitis   Loading dose: 2-300mg Pen/syringe SQ on day 0  Maintenance dose: 1-300mg on day 14 , and every 14 days thereafter.    FDA approved for treatment of pediatric AD in ages 6 months- 5 years    Dosing for 15 - <30kg: No loading dose. 300mg SC every 4weeks  Dosing for 5 - <15kg: Start Dupixent at 200mg SC every 4 weeks                FDA approved for patients 6 years -17 years old         Dosing for less 60k-300mg(600mg total) pen/syringe  SQ day 0, then1- 300mg pen/syringe SQ every 14 days thereafter                  Dosing for 30-60kg :2-200mg(total 400mg) pen/syringe SQ day 0, then 1-200mg pen/syringe SQ every 14 days thereafter                  Dosing for 15-30k-300mg (total 600mg) pen/syringe SQ day 0, then 1-300mg pen/syringe SQ every 14 days thereafter    Both 200mg and 300mg available in autoinjector or pre-filled syringe         3-4 months           No follow-ups on file.

## 2025-07-23 PROBLEM — L20.9 ATOPIC DERMATITIS OF BOTH HANDS: Status: ACTIVE | Noted: 2025-07-23

## 2025-08-12 ENCOUNTER — TELEPHONE (OUTPATIENT)
Dept: DERMATOLOGY | Facility: CLINIC | Age: 60
End: 2025-08-12
Payer: COMMERCIAL

## 2025-09-02 ENCOUNTER — TELEPHONE (OUTPATIENT)
Dept: DERMATOLOGY | Facility: CLINIC | Age: 60
End: 2025-09-02
Payer: COMMERCIAL